# Patient Record
Sex: FEMALE | Race: WHITE | NOT HISPANIC OR LATINO
[De-identification: names, ages, dates, MRNs, and addresses within clinical notes are randomized per-mention and may not be internally consistent; named-entity substitution may affect disease eponyms.]

---

## 2017-03-13 ENCOUNTER — APPOINTMENT (OUTPATIENT)
Dept: INTERNAL MEDICINE | Facility: CLINIC | Age: 22
End: 2017-03-13

## 2017-03-13 VITALS
HEIGHT: 64 IN | RESPIRATION RATE: 12 BRPM | TEMPERATURE: 98 F | OXYGEN SATURATION: 99 % | WEIGHT: 210 LBS | SYSTOLIC BLOOD PRESSURE: 125 MMHG | HEART RATE: 98 BPM | DIASTOLIC BLOOD PRESSURE: 79 MMHG | BODY MASS INDEX: 35.85 KG/M2

## 2017-11-21 ENCOUNTER — EMERGENCY (EMERGENCY)
Facility: HOSPITAL | Age: 22
LOS: 1 days | Discharge: HOME | End: 2017-11-21
Attending: EMERGENCY MEDICINE | Admitting: EMERGENCY MEDICINE
Payer: COMMERCIAL

## 2017-11-21 VITALS
DIASTOLIC BLOOD PRESSURE: 74 MMHG | RESPIRATION RATE: 22 BRPM | OXYGEN SATURATION: 98 % | SYSTOLIC BLOOD PRESSURE: 124 MMHG | HEART RATE: 106 BPM | TEMPERATURE: 98 F

## 2017-11-21 PROCEDURE — 99283 EMERGENCY DEPT VISIT LOW MDM: CPT

## 2017-11-21 RX ORDER — IBUPROFEN 200 MG
600 TABLET ORAL ONCE
Qty: 0 | Refills: 0 | Status: COMPLETED | OUTPATIENT
Start: 2017-11-21 | End: 2017-11-21

## 2017-11-21 RX ADMIN — Medication 600 MILLIGRAM(S): at 13:06
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
## 2017-11-21 NOTE — ED PROVIDER NOTE - PHYSICAL EXAMINATION
Gen: NAD, AOx3  Head: NCAT  HEENT: PERRL, oral mucosa moist, normal conjunctiva  Lung: CTAB, no respiratory distress  CV: rrr, no murmurs, Normal perfusion  Abd: soft, NTND, no CVA tenderness  MSK: No edema, no visible deformities, all extremity joints nontender with FROM; entire spine without midline tenderness and with full ROM, no paravertebral tenderness, no stepoffs or masses; chest wall nontender, +left sided muscular neck tenderness; pelvis stable nontender  Neuro: No focal neurologic deficits, CN intact, motor and sensation intact, no cerebellar signs   Skin: No rash   Psych: normal affect Gen: NAD, AOx3  Head: NCAT  HEENT: PERRL, oral mucosa moist, normal conjunctiva  Lung: CTAB, no respiratory distress  CV: rrr, no murmurs, Normal perfusion  Abd: soft, NTND, no CVA tenderness  MSK: No edema, no visible deformities, all extremity joints nontender with FROM; entire spine without midline tenderness and with full ROM, no paravertebral tenderness, no stepoffs or masses; chest wall nontender, +left sided muscular neck tenderness with spasm; pelvis stable nontender. Small abrasion to left forearm  Neuro: No focal neurologic deficits, CN intact, motor and sensation intact, no cerebellar signs   Skin: No rash   Psych: normal affect

## 2017-11-21 NOTE — ED PROVIDER NOTE - ATTENDING CONTRIBUTION TO CARE
Toña Fam MD - Attending Physician: I have personally seen and examined this patient with the resident.  I have fully participated in the care of this patient. I have reviewed all pertinent clinical information, including history, physical exam, plan and the Resident’s note and agree except as noted. See MDM
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
## 2017-11-22 ENCOUNTER — TRANSCRIPTION ENCOUNTER (OUTPATIENT)
Age: 22
End: 2017-11-22
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
## 2017-12-01 ENCOUNTER — APPOINTMENT (OUTPATIENT)
Dept: INTERNAL MEDICINE | Facility: CLINIC | Age: 22
End: 2017-12-01
Payer: COMMERCIAL

## 2017-12-01 VITALS
DIASTOLIC BLOOD PRESSURE: 77 MMHG | HEIGHT: 64 IN | OXYGEN SATURATION: 98 % | WEIGHT: 125 LBS | SYSTOLIC BLOOD PRESSURE: 119 MMHG | RESPIRATION RATE: 12 BRPM | HEART RATE: 93 BPM | BODY MASS INDEX: 21.34 KG/M2 | TEMPERATURE: 97.8 F
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
## 2017-12-01 DIAGNOSIS — R07.89 OTHER CHEST PAIN: ICD-10-CM

## 2017-12-01 DIAGNOSIS — Z87.312 PERSONAL HISTORY OF (HEALED) STRESS FRACTURE: ICD-10-CM

## 2017-12-01 PROCEDURE — 99214 OFFICE O/P EST MOD 30 MIN: CPT

## 2018-01-23 DIAGNOSIS — M54.5 LOW BACK PAIN: ICD-10-CM

## 2018-03-14 ENCOUNTER — APPOINTMENT (OUTPATIENT)
Dept: ORTHOPEDIC SURGERY | Facility: CLINIC | Age: 23
End: 2018-03-14
Payer: COMMERCIAL

## 2018-03-14 VITALS — BODY MASS INDEX: 20.49 KG/M2 | HEIGHT: 64 IN | WEIGHT: 120 LBS

## 2018-03-14 DIAGNOSIS — M54.9 DORSALGIA, UNSPECIFIED: ICD-10-CM
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
## 2018-03-14 DIAGNOSIS — M42.00 JUVENILE OSTEOCHONDROSIS OF SPINE, SITE UNSPECIFIED: ICD-10-CM

## 2018-03-14 PROCEDURE — 72070 X-RAY EXAM THORAC SPINE 2VWS: CPT

## 2018-03-14 PROCEDURE — 99244 OFF/OP CNSLTJ NEW/EST MOD 40: CPT

## 2018-04-06 ENCOUNTER — APPOINTMENT (OUTPATIENT)
Dept: ORTHOPEDIC SURGERY | Facility: CLINIC | Age: 23
End: 2018-04-06
Payer: COMMERCIAL

## 2018-04-06 VITALS
BODY MASS INDEX: 20.49 KG/M2 | DIASTOLIC BLOOD PRESSURE: 74 MMHG | HEART RATE: 79 BPM | WEIGHT: 120 LBS | HEIGHT: 64 IN | SYSTOLIC BLOOD PRESSURE: 113 MMHG

## 2018-04-06 PROCEDURE — 72040 X-RAY EXAM NECK SPINE 2-3 VW: CPT

## 2018-04-06 PROCEDURE — 99214 OFFICE O/P EST MOD 30 MIN: CPT

## 2018-05-07 ENCOUNTER — APPOINTMENT (OUTPATIENT)
Dept: ORTHOPEDIC SURGERY | Facility: CLINIC | Age: 23
End: 2018-05-07
Payer: COMMERCIAL

## 2018-05-07 VITALS
SYSTOLIC BLOOD PRESSURE: 121 MMHG | HEIGHT: 64 IN | BODY MASS INDEX: 20.49 KG/M2 | DIASTOLIC BLOOD PRESSURE: 84 MMHG | WEIGHT: 120 LBS | HEART RATE: 100 BPM

## 2018-05-07 DIAGNOSIS — M25.512 PAIN IN LEFT SHOULDER: ICD-10-CM

## 2018-05-07 DIAGNOSIS — G89.29 PAIN IN LEFT SHOULDER: ICD-10-CM

## 2018-05-07 PROCEDURE — 99203 OFFICE O/P NEW LOW 30 MIN: CPT

## 2018-05-07 PROCEDURE — 73030 X-RAY EXAM OF SHOULDER: CPT | Mod: LT

## 2018-05-09 PROBLEM — M25.512 CHRONIC PERISCAPULAR PAIN ON LEFT SIDE: Status: ACTIVE | Noted: 2018-05-09

## 2018-05-10 ENCOUNTER — APPOINTMENT (OUTPATIENT)
Dept: ORTHOPEDIC SURGERY | Facility: CLINIC | Age: 23
End: 2018-05-10
Payer: COMMERCIAL

## 2018-05-10 DIAGNOSIS — M54.6 PAIN IN THORACIC SPINE: ICD-10-CM

## 2018-05-10 PROCEDURE — 99214 OFFICE O/P EST MOD 30 MIN: CPT

## 2018-05-21 ENCOUNTER — APPOINTMENT (OUTPATIENT)
Dept: INTERNAL MEDICINE | Facility: CLINIC | Age: 23
End: 2018-05-21
Payer: COMMERCIAL

## 2018-05-21 VITALS
RESPIRATION RATE: 12 BRPM | SYSTOLIC BLOOD PRESSURE: 112 MMHG | OXYGEN SATURATION: 95 % | DIASTOLIC BLOOD PRESSURE: 74 MMHG | WEIGHT: 125 LBS | BODY MASS INDEX: 21.34 KG/M2 | HEIGHT: 64 IN | TEMPERATURE: 98.4 F | HEART RATE: 90 BPM

## 2018-05-21 PROCEDURE — 99214 OFFICE O/P EST MOD 30 MIN: CPT

## 2018-06-10 NOTE — HISTORY OF PRESENT ILLNESS
[FreeTextEntry8] : 23 yr old female here for acute.  Starting having chills 3 nights ago, along w/cold sore.  Since then, also developed sore throat, upset stomach, nasal congestion, cough.  Denies any ear ache, chest pain, abd pain, diarrhea.  Started taking zyrtec but no relief in sx.  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

## 2018-06-10 NOTE — PHYSICAL EXAM
[No Acute Distress] : no acute distress [Well Nourished] : well nourished [Well Developed] : well developed [Well-Appearing] : well-appearing [Normal Voice/Communication] : normal voice/communication [Normal Sclera/Conjunctiva] : normal sclera/conjunctiva [EOMI] : extraocular movements intact [Normal Outer Ear/Nose] : the outer ears and nose were normal in appearance [Normal TMs] : both tympanic membranes were normal [No JVD] : no jugular venous distention [Supple] : supple [No Lymphadenopathy] : no lymphadenopathy [Thyroid Normal, No Nodules] : the thyroid was normal and there were no nodules present [No Respiratory Distress] : no respiratory distress  [Clear to Auscultation] : lungs were clear to auscultation bilaterally [No Accessory Muscle Use] : no accessory muscle use [Normal Rate] : normal rate  [Regular Rhythm] : with a regular rhythm [Normal S1, S2] : normal S1 and S2 [No Murmur] : no murmur heard [Soft] : abdomen soft [Non Tender] : non-tender [Non-distended] : non-distended [No Masses] : no abdominal mass palpated [No HSM] : no HSM [Normal Bowel Sounds] : normal bowel sounds [Normal Supraclavicular Nodes] : no supraclavicular lymphadenopathy [Normal Posterior Cervical Nodes] : no posterior cervical lymphadenopathy [Normal Anterior Cervical Nodes] : no anterior cervical lymphadenopathy [No Joint Swelling] : no joint swelling [Grossly Normal Strength/Tone] : grossly normal strength/tone [No Rash] : no rash [Normal Gait] : normal gait [Coordination Grossly Intact] : coordination grossly intact [No Focal Deficits] : no focal deficits [Speech Grossly Normal] : speech grossly normal [Memory Grossly Normal] : memory grossly normal [Normal Affect] : the affect was normal [Alert and Oriented x3] : oriented to person, place, and time [Normal Mood] : the mood was normal [Normal Insight/Judgement] : insight and judgment were intact [de-identified] : nasal mucosal congestion with clear discharge; trace PND on oropharynx

## 2018-06-10 NOTE — ASSESSMENT
[FreeTextEntry1] : 23 yr old female w/ acute viral syndrome / viral URI with cough, nasal congestion, and rhinorrhea.  Supportive care d/w pt:  rest, fluids, fever/pain mgmt, sx mgmt w/OTC remedies, rx Flonase usage and sfx.  RTO if sx persist or worsen.  Ordering Valtrex for cold sore as well.  Counseled patient for greater than 50% of this visit, including diagnoses information, medication usage and side effects, therapeutic goals and options, and followup. Patient's questions were answered. Patient expressed understanding of, and agreement with, assessment and plan.

## 2018-06-10 NOTE — REVIEW OF SYSTEMS
[Chills] : chills [Night Sweats] : night sweats [Nasal Discharge] : nasal discharge [Sore Throat] : sore throat [Postnasal Drip] : postnasal drip [Cough] : cough [Negative] : Neurological [Fever] : no fever [Fatigue] : no fatigue [Hot Flashes] : no hot flashes [Earache] : no earache [Hearing Loss] : no hearing loss [Nosebleed] : no nosebleeds [Hoarseness] : no hoarseness [Wheezing] : no wheezing [Dyspnea on Exertion] : no dyspnea on exertion [FreeTextEntry4] : cold sore [FreeTextEntry7] : see HPI [FreeTextEntry9] : diffuse myalgias

## 2018-06-15 ENCOUNTER — APPOINTMENT (OUTPATIENT)
Dept: INTERNAL MEDICINE | Facility: CLINIC | Age: 23
End: 2018-06-15
Payer: COMMERCIAL

## 2018-06-15 VITALS
WEIGHT: 124 LBS | HEIGHT: 64 IN | SYSTOLIC BLOOD PRESSURE: 112 MMHG | RESPIRATION RATE: 12 BRPM | BODY MASS INDEX: 21.17 KG/M2 | DIASTOLIC BLOOD PRESSURE: 71 MMHG | TEMPERATURE: 98 F | HEART RATE: 90 BPM | OXYGEN SATURATION: 98 %

## 2018-06-15 DIAGNOSIS — J30.9 ALLERGIC RHINITIS, UNSPECIFIED: ICD-10-CM

## 2018-06-15 DIAGNOSIS — Z86.19 PERSONAL HISTORY OF OTHER INFECTIOUS AND PARASITIC DISEASES: ICD-10-CM

## 2018-06-15 DIAGNOSIS — Z87.09 PERSONAL HISTORY OF OTHER DISEASES OF THE RESPIRATORY SYSTEM: ICD-10-CM

## 2018-06-15 DIAGNOSIS — Z02.0 ENCOUNTER FOR EXAMINATION FOR ADMISSION TO EDUCATIONAL INSTITUTION: ICD-10-CM

## 2018-06-15 DIAGNOSIS — Z11.1 ENCOUNTER FOR SCREENING FOR RESPIRATORY TUBERCULOSIS: ICD-10-CM

## 2018-06-15 DIAGNOSIS — B97.89 ACUTE UPPER RESPIRATORY INFECTION, UNSPECIFIED: ICD-10-CM

## 2018-06-15 DIAGNOSIS — F41.1 GENERALIZED ANXIETY DISORDER: ICD-10-CM

## 2018-06-15 DIAGNOSIS — B34.9 VIRAL INFECTION, UNSPECIFIED: ICD-10-CM

## 2018-06-15 DIAGNOSIS — Z23 ENCOUNTER FOR IMMUNIZATION: ICD-10-CM

## 2018-06-15 DIAGNOSIS — Z01.84 ENCOUNTER FOR ANTIBODY RESPONSE EXAMINATION: ICD-10-CM

## 2018-06-15 DIAGNOSIS — J06.9 ACUTE UPPER RESPIRATORY INFECTION, UNSPECIFIED: ICD-10-CM

## 2018-06-15 DIAGNOSIS — Z86.59 PERSONAL HISTORY OF OTHER MENTAL AND BEHAVIORAL DISORDERS: ICD-10-CM

## 2018-06-15 PROCEDURE — 99395 PREV VISIT EST AGE 18-39: CPT | Mod: 25

## 2018-06-15 PROCEDURE — 90471 IMMUNIZATION ADMIN: CPT

## 2018-06-15 PROCEDURE — 90715 TDAP VACCINE 7 YRS/> IM: CPT

## 2018-06-15 RX ORDER — MELOXICAM 15 MG/1
15 TABLET ORAL
Qty: 30 | Refills: 1 | Status: COMPLETED | COMMUNITY
Start: 2018-05-10 | End: 2018-06-15

## 2018-06-15 RX ORDER — IBUPROFEN 800 MG/1
800 TABLET, FILM COATED ORAL
Qty: 90 | Refills: 0 | Status: COMPLETED | COMMUNITY
Start: 2018-03-14 | End: 2018-06-15

## 2018-06-15 RX ORDER — IBUPROFEN 600 MG/1
600 TABLET, FILM COATED ORAL 3 TIMES DAILY
Qty: 15 | Refills: 1 | Status: COMPLETED | COMMUNITY
Start: 2017-12-01 | End: 2018-06-15

## 2018-06-15 RX ORDER — CYCLOBENZAPRINE HYDROCHLORIDE 10 MG/1
10 TABLET, FILM COATED ORAL
Qty: 15 | Refills: 0 | Status: COMPLETED | COMMUNITY
Start: 2017-11-22 | End: 2018-06-15

## 2018-06-22 LAB
25(OH)D3 SERPL-MCNC: 34.3 NG/ML
ADJUSTED MITOGEN: 8.03 IU/ML
ADJUSTED TB AG: 0 IU/ML
ALBUMIN SERPL ELPH-MCNC: 4.6 G/DL
ALP BLD-CCNC: 58 U/L
ALT SERPL-CCNC: 21 U/L
ANION GAP SERPL CALC-SCNC: 15 MMOL/L
APPEARANCE: CLEAR
AST SERPL-CCNC: 40 U/L
BILIRUB SERPL-MCNC: 0.3 MG/DL
BILIRUBIN URINE: NEGATIVE
BLOOD URINE: NEGATIVE
BUN SERPL-MCNC: 12 MG/DL
CALCIUM SERPL-MCNC: 9.9 MG/DL
CHLORIDE SERPL-SCNC: 99 MMOL/L
CHOLEST SERPL-MCNC: 160 MG/DL
CHOLEST/HDLC SERPL: 2.2 RATIO
CO2 SERPL-SCNC: 25 MMOL/L
COLOR: YELLOW
CREAT SERPL-MCNC: 0.73 MG/DL
ESTIMATED AVERAGE GLUCOSE: 100 MG/DL
FOLATE SERPL-MCNC: >20 NG/ML
GLUCOSE QUALITATIVE U: NEGATIVE MG/DL
GLUCOSE SERPL-MCNC: 60 MG/DL
HBA1C MFR BLD HPLC: 5.1 %
HBV SURFACE AB SER QL: NONREACTIVE
HBV SURFACE AG SER QL: NONREACTIVE
HDLC SERPL-MCNC: 73 MG/DL
IRON SERPL-MCNC: 152 UG/DL
KETONES URINE: NEGATIVE
LDLC SERPL CALC-MCNC: 65 MG/DL
LEUKOCYTE ESTERASE URINE: NEGATIVE
M TB IFN-G BLD-IMP: NEGATIVE
MEV IGG FLD QL IA: 190 AU/ML
MEV IGG+IGM SER-IMP: POSITIVE
MUV AB SER-ACNC: POSITIVE
MUV IGG SER QL IA: >300 AU/ML
NITRITE URINE: NEGATIVE
PH URINE: 7
POTASSIUM SERPL-SCNC: 6.1 MMOL/L
PROT SERPL-MCNC: 8 G/DL
PROTEIN URINE: NEGATIVE MG/DL
QUANTIFERON GOLD NIL: 0.02 IU/ML
RUBV IGG FLD-ACNC: 11.5 INDEX
RUBV IGG SER-IMP: POSITIVE
SODIUM SERPL-SCNC: 139 MMOL/L
SPECIFIC GRAVITY URINE: 1.02
TRIGL SERPL-MCNC: 108 MG/DL
TSH SERPL-ACNC: 2.84 UIU/ML
UROBILINOGEN URINE: NEGATIVE MG/DL
VIT B12 SERPL-MCNC: 878 PG/ML
VZV AB TITR SER: POSITIVE
VZV IGG SER IF-ACNC: 2745 INDEX

## 2018-06-25 LAB
BASOPHILS # BLD AUTO: 0.03 K/UL
BASOPHILS NFR BLD AUTO: 0.5 %
EOSINOPHIL # BLD AUTO: 0.14 K/UL
EOSINOPHIL NFR BLD AUTO: 2.1 %
HCT VFR BLD CALC: 41 %
HGB BLD-MCNC: 14 G/DL
IMM GRANULOCYTES NFR BLD AUTO: 0.2 %
LYMPHOCYTES # BLD AUTO: 1.55 K/UL
LYMPHOCYTES NFR BLD AUTO: 23.4 %
MAN DIFF?: NORMAL
MCHC RBC-ENTMCNC: 29.9 PG
MCHC RBC-ENTMCNC: 34.1 GM/DL
MCV RBC AUTO: 87.4 FL
MONOCYTES # BLD AUTO: 0.57 K/UL
MONOCYTES NFR BLD AUTO: 8.6 %
NEUTROPHILS # BLD AUTO: 4.31 K/UL
NEUTROPHILS NFR BLD AUTO: 65.2 %
PLATELET # BLD AUTO: 261 K/UL
RBC # BLD: 4.69 M/UL
RBC # FLD: 13.3 %
WBC # FLD AUTO: 6.61 K/UL

## 2018-06-27 ENCOUNTER — APPOINTMENT (OUTPATIENT)
Dept: INTERNAL MEDICINE | Facility: CLINIC | Age: 23
End: 2018-06-27

## 2018-07-04 NOTE — ASSESSMENT
[FreeTextEntry1] : 23 yr old female w/hx of allergic rhinitis, anxiety and depression, here for CPE and work PE.  Normal CPE/ work PE.  Screening for TB and immunity status.  To get TdaP today.  Anticipatory guidance provided.  Allergies, depression and anxiety are all clinically stable.  Labs today.  Counseled patient for greater than 50% of this visit, including diagnoses information, medication usage and side effects, therapeutic goals and options, and followup. Patient's questions were answered. Patient expressed understanding of, and agreement with, assessment and plan.

## 2018-07-04 NOTE — HISTORY OF PRESENT ILLNESS
[de-identified] : 23 yr old female here for CPE and school PE.  Currently pt denies any N/V/D, weakness, malaise, F/C/NS, HA, LH, LOC, COV, palpitations, CP, SOB, ALEGRE, hemoptysis, abdominal pain, edema, claudication, limb weakness or paresthesias, polydipsia, polyphagia, polyuria, weight loss, hyperhidrosis, tremors, agitation, urinary F/E/D, melena, or BRBPR.\par \par Last GYN exam 2 yr ago = normal\par

## 2018-07-04 NOTE — PHYSICAL EXAM
[No Acute Distress] : no acute distress [Well Nourished] : well nourished [Well Developed] : well developed [Well-Appearing] : well-appearing [Normal Sclera/Conjunctiva] : normal sclera/conjunctiva [PERRL] : pupils equal round and reactive to light [EOMI] : extraocular movements intact [Normal Outer Ear/Nose] : the outer ears and nose were normal in appearance [Normal Oropharynx] : the oropharynx was normal [No JVD] : no jugular venous distention [Supple] : supple [No Lymphadenopathy] : no lymphadenopathy [Thyroid Normal, No Nodules] : the thyroid was normal and there were no nodules present [No Respiratory Distress] : no respiratory distress  [Clear to Auscultation] : lungs were clear to auscultation bilaterally [No Accessory Muscle Use] : no accessory muscle use [Normal Rate] : normal rate  [Regular Rhythm] : with a regular rhythm [Normal S1, S2] : normal S1 and S2 [No Murmur] : no murmur heard [No Carotid Bruits] : no carotid bruits [No Edema] : there was no peripheral edema [Soft] : abdomen soft [Non Tender] : non-tender [Non-distended] : non-distended [No Masses] : no abdominal mass palpated [No HSM] : no HSM [Normal Bowel Sounds] : normal bowel sounds [Normal Posterior Cervical Nodes] : no posterior cervical lymphadenopathy [Normal Anterior Cervical Nodes] : no anterior cervical lymphadenopathy [No CVA Tenderness] : no CVA  tenderness [No Spinal Tenderness] : no spinal tenderness [No Joint Swelling] : no joint swelling [Grossly Normal Strength/Tone] : grossly normal strength/tone [No Rash] : no rash [Normal Gait] : normal gait [Coordination Grossly Intact] : coordination grossly intact [No Focal Deficits] : no focal deficits [Deep Tendon Reflexes (DTR)] : deep tendon reflexes were 2+ and symmetric [Normal Affect] : the affect was normal [Normal Insight/Judgement] : insight and judgment were intact [Normal Voice/Communication] : normal voice/communication [Normal TMs] : both tympanic membranes were normal [Normal Nasal Mucosa] : the nasal mucosa was normal [Normal Supraclavicular Nodes] : no supraclavicular lymphadenopathy [No Skin Lesions] : no skin lesions [Speech Grossly Normal] : speech grossly normal [Memory Grossly Normal] : memory grossly normal [Alert and Oriented x3] : oriented to person, place, and time [Normal Mood] : the mood was normal

## 2019-01-04 ENCOUNTER — OUTPATIENT (OUTPATIENT)
Dept: OUTPATIENT SERVICES | Facility: HOSPITAL | Age: 24
LOS: 1 days | End: 2019-01-04
Payer: COMMERCIAL

## 2019-01-04 VITALS
OXYGEN SATURATION: 100 % | HEART RATE: 89 BPM | DIASTOLIC BLOOD PRESSURE: 79 MMHG | RESPIRATION RATE: 16 BRPM | HEIGHT: 64 IN | WEIGHT: 121.92 LBS | SYSTOLIC BLOOD PRESSURE: 112 MMHG | TEMPERATURE: 98 F

## 2019-01-04 DIAGNOSIS — Z01.818 ENCOUNTER FOR OTHER PREPROCEDURAL EXAMINATION: ICD-10-CM

## 2019-01-04 DIAGNOSIS — N89.6 TIGHT HYMENAL RING: ICD-10-CM

## 2019-01-04 DIAGNOSIS — F41.9 ANXIETY DISORDER, UNSPECIFIED: ICD-10-CM

## 2019-01-04 LAB
HCG SERPL-ACNC: <2 MIU/ML — SIGNIFICANT CHANGE UP
HCT VFR BLD CALC: 37.9 % — SIGNIFICANT CHANGE UP (ref 34.5–45)
HGB BLD-MCNC: 12.5 G/DL — SIGNIFICANT CHANGE UP (ref 11.5–15.5)
MCHC RBC-ENTMCNC: 29.1 PG — SIGNIFICANT CHANGE UP (ref 27–34)
MCHC RBC-ENTMCNC: 33 GM/DL — SIGNIFICANT CHANGE UP (ref 32–36)
MCV RBC AUTO: 88.3 FL — SIGNIFICANT CHANGE UP (ref 80–100)
PLATELET # BLD AUTO: 268 K/UL — SIGNIFICANT CHANGE UP (ref 150–400)
RBC # BLD: 4.29 M/UL — SIGNIFICANT CHANGE UP (ref 3.8–5.2)
RBC # FLD: 13 % — SIGNIFICANT CHANGE UP (ref 10.3–14.5)
WBC # BLD: 3.95 K/UL — SIGNIFICANT CHANGE UP (ref 3.8–10.5)
WBC # FLD AUTO: 3.95 K/UL — SIGNIFICANT CHANGE UP (ref 3.8–10.5)

## 2019-01-04 PROCEDURE — 85027 COMPLETE CBC AUTOMATED: CPT

## 2019-01-04 PROCEDURE — G0463: CPT

## 2019-01-04 PROCEDURE — 84702 CHORIONIC GONADOTROPIN TEST: CPT

## 2019-01-04 RX ORDER — SODIUM CHLORIDE 9 MG/ML
3 INJECTION INTRAMUSCULAR; INTRAVENOUS; SUBCUTANEOUS EVERY 8 HOURS
Qty: 0 | Refills: 0 | Status: DISCONTINUED | OUTPATIENT
Start: 2019-01-18 | End: 2019-02-02

## 2019-01-04 RX ORDER — LIDOCAINE HCL 20 MG/ML
0.2 VIAL (ML) INJECTION ONCE
Qty: 0 | Refills: 0 | Status: DISCONTINUED | OUTPATIENT
Start: 2019-01-18 | End: 2019-02-02

## 2019-01-04 NOTE — H&P PST ADULT - PROBLEM SELECTOR PLAN 1
Partial hymenectomy  PSt instruction given CBC, serum pregnancy test drawn pending result   Urine pregnancy test on DOS

## 2019-01-04 NOTE — H&P PST ADULT - ATTENDING COMMENTS
pt with tight hymen, unable to admit more than 1 finger, after many discussions, pt requested release of tissue, consented to partial hymenectomy. Pt understands risks, benefits, possible need for reop later

## 2019-01-04 NOTE — H&P PST ADULT - NSANTHOSAYNRD_GEN_A_CORE
No. STONE screening performed.  STOP BANG Legend: 0-2 = LOW Risk; 3-4 = INTERMEDIATE Risk; 5-8 = HIGH Risk

## 2019-01-04 NOTE — H&P PST ADULT - HISTORY OF PRESENT ILLNESS
22 y/o female with hx of anxiety /panic disorder came in for PSt today for partial hymenectomy. Patient has hx of pelvic discomfort went to see Dr Saleh evaluated and indicated this procedure for treatment.

## 2019-01-17 ENCOUNTER — TRANSCRIPTION ENCOUNTER (OUTPATIENT)
Age: 24
End: 2019-01-17

## 2019-01-18 ENCOUNTER — OUTPATIENT (OUTPATIENT)
Dept: OUTPATIENT SERVICES | Facility: HOSPITAL | Age: 24
LOS: 1 days | End: 2019-01-18
Payer: COMMERCIAL

## 2019-01-18 VITALS
WEIGHT: 121.92 LBS | TEMPERATURE: 98 F | RESPIRATION RATE: 16 BRPM | DIASTOLIC BLOOD PRESSURE: 79 MMHG | OXYGEN SATURATION: 100 % | SYSTOLIC BLOOD PRESSURE: 120 MMHG | HEIGHT: 64 IN | HEART RATE: 112 BPM

## 2019-01-18 VITALS
OXYGEN SATURATION: 100 % | SYSTOLIC BLOOD PRESSURE: 106 MMHG | DIASTOLIC BLOOD PRESSURE: 57 MMHG | RESPIRATION RATE: 16 BRPM | TEMPERATURE: 97 F | HEART RATE: 100 BPM

## 2019-01-18 DIAGNOSIS — Z01.818 ENCOUNTER FOR OTHER PREPROCEDURAL EXAMINATION: ICD-10-CM

## 2019-01-18 DIAGNOSIS — N89.6 TIGHT HYMENAL RING: ICD-10-CM

## 2019-01-18 PROCEDURE — 56700 PRTL HYMNCTMY/REVJ HYMNL RNG: CPT

## 2019-01-18 RX ORDER — ACETAMINOPHEN 500 MG
1000 TABLET ORAL ONCE
Qty: 0 | Refills: 0 | Status: DISCONTINUED | OUTPATIENT
Start: 2019-01-18 | End: 2019-02-02

## 2019-01-18 RX ORDER — ACETAMINOPHEN 500 MG
1000 TABLET ORAL ONCE
Qty: 0 | Refills: 0 | Status: COMPLETED | OUTPATIENT
Start: 2019-01-18 | End: 2019-01-18

## 2019-01-18 RX ORDER — CLONAZEPAM 1 MG
0 TABLET ORAL
Qty: 0 | Refills: 0 | COMMUNITY

## 2019-01-18 RX ORDER — CELECOXIB 200 MG/1
200 CAPSULE ORAL ONCE
Qty: 0 | Refills: 0 | Status: COMPLETED | OUTPATIENT
Start: 2019-01-18 | End: 2019-01-18

## 2019-01-18 RX ORDER — ONDANSETRON 8 MG/1
4 TABLET, FILM COATED ORAL ONCE
Qty: 0 | Refills: 0 | Status: DISCONTINUED | OUTPATIENT
Start: 2019-01-18 | End: 2019-02-02

## 2019-01-18 RX ORDER — HYDROMORPHONE HYDROCHLORIDE 2 MG/ML
0.25 INJECTION INTRAMUSCULAR; INTRAVENOUS; SUBCUTANEOUS
Qty: 0 | Refills: 0 | Status: DISCONTINUED | OUTPATIENT
Start: 2019-01-18 | End: 2019-01-18

## 2019-01-18 RX ORDER — OXYCODONE HYDROCHLORIDE 5 MG/1
5 TABLET ORAL ONCE
Qty: 0 | Refills: 0 | Status: DISCONTINUED | OUTPATIENT
Start: 2019-01-18 | End: 2019-01-18

## 2019-01-18 RX ORDER — VENLAFAXINE HCL 75 MG
0 CAPSULE, EXT RELEASE 24 HR ORAL
Qty: 0 | Refills: 0 | COMMUNITY

## 2019-01-18 RX ORDER — SODIUM CHLORIDE 9 MG/ML
1000 INJECTION, SOLUTION INTRAVENOUS
Qty: 0 | Refills: 0 | Status: DISCONTINUED | OUTPATIENT
Start: 2019-01-18 | End: 2019-02-02

## 2019-01-18 NOTE — BRIEF OPERATIVE NOTE - PROCEDURE
<<-----Click on this checkbox to enter Procedure Hymenectomy, partial, or hymenal ring revision  01/18/2019  release of thick hymen band, 2 lateral incisions made  Active  CMELGAR

## 2019-01-18 NOTE — ASU DISCHARGE PLAN (ADULT/PEDIATRIC). - ACTIVITY LEVEL
nothing per rectum/4 days/no exercise/nothing per vagina/no tampons/no sports/gym/no heavy lifting/quiet play/no tub baths no exercise/nothing per vagina/no tub baths/nothing per rectum/no tampons/4 days/no sports/gym/no heavy lifting/quiet play/no intercourse

## 2019-04-11 ENCOUNTER — APPOINTMENT (OUTPATIENT)
Dept: INTERNAL MEDICINE | Facility: CLINIC | Age: 24
End: 2019-04-11
Payer: COMMERCIAL

## 2019-04-11 VITALS
HEIGHT: 64 IN | RESPIRATION RATE: 12 BRPM | HEART RATE: 92 BPM | TEMPERATURE: 98.1 F | OXYGEN SATURATION: 100 % | WEIGHT: 122 LBS | DIASTOLIC BLOOD PRESSURE: 73 MMHG | SYSTOLIC BLOOD PRESSURE: 126 MMHG | BODY MASS INDEX: 20.83 KG/M2

## 2019-04-11 DIAGNOSIS — H57.11 OCULAR PAIN, RIGHT EYE: ICD-10-CM

## 2019-04-11 DIAGNOSIS — Z87.09 PERSONAL HISTORY OF OTHER DISEASES OF THE RESPIRATORY SYSTEM: ICD-10-CM

## 2019-04-11 PROCEDURE — 99214 OFFICE O/P EST MOD 30 MIN: CPT

## 2019-04-11 NOTE — PHYSICAL EXAM
[Normal Sclera/Conjunctiva] : normal sclera/conjunctiva [PERRL] : pupils equal round and reactive to light [No Acute Distress] : no acute distress [EOMI] : extraocular movements intact [Fundoscopic Exam Performed] : fundoscopic ~T exam ~C was performed [Normal Outer Ear/Nose] : the outer ears and nose were normal in appearance [Normal Oropharynx] : the oropharynx was normal [Normal TMs] : both tympanic membranes were normal [No JVD] : no jugular venous distention [Supple] : supple [No Lymphadenopathy] : no lymphadenopathy [No Respiratory Distress] : no respiratory distress  [Thyroid Normal, No Nodules] : the thyroid was normal and there were no nodules present [Clear to Auscultation] : lungs were clear to auscultation bilaterally [No Accessory Muscle Use] : no accessory muscle use [Normal Rate] : normal rate  [Regular Rhythm] : with a regular rhythm [de-identified] : CRUSTY NAAL D/C JIHAN,MILD JIHAN MAXILLARY  TENDERNESS

## 2019-04-11 NOTE — ASSESSMENT
[FreeTextEntry1] : 24 Y OLD FEM PRESENTS WITH ALLERGIC RHINITIS WITH SUPERIMPOSED MAXILLARY SINUSITIS= DOXY 100 MG BID RX SENT,ADD MUCINEX\par R EYE PAIN MAYBE RELATED TO SINUSITIS BUT REFER TO SEE OPHTHALMOLOGY FOR EVAL,LAST SEEN 1 Y AGO = WNL

## 2019-04-11 NOTE — HISTORY OF PRESENT ILLNESS
[FreeTextEntry8] : PT SEEN BY ENT 3 DAYS AGO WITH NASAL CONGESTION ITCHY EYES AND NOSE AND RX FLONASE AND ASTELIN ,USING ONLY LAST ONE WITH SOME IMPROVEMENT OF SX BUT LAST 2 DAYS HAS BEEN HAVING PERSISTENT PURULENT NASAL D/C MILD CHILLS AND PRESSURE BEHIND R EYE ,NO VISION DISTURBANCE.\par PT ASLO TAKES ZYRTEC OTC WITH LITTLE HELP

## 2019-04-11 NOTE — REVIEW OF SYSTEMS
[Chills] : chills [Pain] : pain [Nasal Discharge] : nasal discharge [Postnasal Drip] : postnasal drip [Cough] : cough [Negative] : Heme/Lymph

## 2019-07-15 ENCOUNTER — TRANSCRIPTION ENCOUNTER (OUTPATIENT)
Age: 24
End: 2019-07-15

## 2019-07-17 ENCOUNTER — APPOINTMENT (OUTPATIENT)
Dept: PULMONOLOGY | Facility: CLINIC | Age: 24
End: 2019-07-17
Payer: COMMERCIAL

## 2019-07-17 VITALS
OXYGEN SATURATION: 98 % | DIASTOLIC BLOOD PRESSURE: 71 MMHG | HEIGHT: 64 IN | HEART RATE: 76 BPM | SYSTOLIC BLOOD PRESSURE: 106 MMHG | TEMPERATURE: 98.2 F | RESPIRATION RATE: 12 BRPM

## 2019-07-17 VITALS — WEIGHT: 120 LBS | BODY MASS INDEX: 20.6 KG/M2

## 2019-07-17 DIAGNOSIS — Z83.3 FAMILY HISTORY OF DIABETES MELLITUS: ICD-10-CM

## 2019-07-17 DIAGNOSIS — Z86.19 PERSONAL HISTORY OF OTHER INFECTIOUS AND PARASITIC DISEASES: ICD-10-CM

## 2019-07-17 DIAGNOSIS — Z87.09 PERSONAL HISTORY OF OTHER DISEASES OF THE RESPIRATORY SYSTEM: ICD-10-CM

## 2019-07-17 PROCEDURE — 99203 OFFICE O/P NEW LOW 30 MIN: CPT

## 2019-07-17 NOTE — ASSESSMENT
[FreeTextEntry1] : In summary the patient is a 24-year-old female with past medical history significant for who presents with signs and symptoms of acute pharyngitis. Patient is started on antibiotics and is instructed to followup on a when necessary basis

## 2019-07-17 NOTE — HISTORY OF PRESENT ILLNESS
[FreeTextEntry1] : Patient is a 24-year-old female with past medical history significant for anxiety who presents for an acute visit. The patient complained of increasing cough occasional shortness of breath and associated sore throat for the last one week. She denies any fevers chills chest pain weight loss or hemoptysis

## 2019-07-17 NOTE — PHYSICAL EXAM
[General Appearance - Well Developed] : well developed [Normal Appearance] : normal appearance [Well Groomed] : well groomed [General Appearance - Well Nourished] : well nourished [No Deformities] : no deformities [General Appearance - In No Acute Distress] : no acute distress [Normal Conjunctiva] : the conjunctiva exhibited no abnormalities [Eyelids - No Xanthelasma] : the eyelids demonstrated no xanthelasmas [Normal Oropharynx] : normal oropharynx [II] : II [Neck Appearance] : the appearance of the neck was normal [Neck Cervical Mass (___cm)] : no neck mass was observed [Jugular Venous Distention Increased] : there was no jugular-venous distention [Heart Sounds] : normal S1 and S2 [Respiration, Rhythm And Depth] : normal respiratory rhythm and effort [Exaggerated Use Of Accessory Muscles For Inspiration] : no accessory muscle use [Auscultation Breath Sounds / Voice Sounds] : lungs were clear to auscultation bilaterally [Bowel Sounds] : normal bowel sounds [Abdomen Soft] : soft [Abnormal Walk] : normal gait [Nail Clubbing] : no clubbing of the fingernails [Cyanosis, Localized] : no localized cyanosis [] : no rash [No Focal Deficits] : no focal deficits [Oriented To Time, Place, And Person] : oriented to person, place, and time [Impaired Insight] : insight and judgment were intact [Affect] : the affect was normal [Mood] : the mood was normal

## 2019-12-17 ENCOUNTER — APPOINTMENT (OUTPATIENT)
Dept: INTERNAL MEDICINE | Facility: CLINIC | Age: 24
End: 2019-12-17
Payer: COMMERCIAL

## 2019-12-17 VITALS
DIASTOLIC BLOOD PRESSURE: 63 MMHG | WEIGHT: 111 LBS | RESPIRATION RATE: 12 BRPM | BODY MASS INDEX: 18.95 KG/M2 | HEART RATE: 103 BPM | SYSTOLIC BLOOD PRESSURE: 103 MMHG | HEIGHT: 64 IN | OXYGEN SATURATION: 100 % | TEMPERATURE: 97.7 F

## 2019-12-17 DIAGNOSIS — R63.4 ABNORMAL WEIGHT LOSS: ICD-10-CM

## 2019-12-17 DIAGNOSIS — F32.9 MAJOR DEPRESSIVE DISORDER, SINGLE EPISODE, UNSPECIFIED: ICD-10-CM

## 2019-12-17 DIAGNOSIS — K62.5 HEMORRHAGE OF ANUS AND RECTUM: ICD-10-CM

## 2019-12-17 LAB
ALBUMIN SERPL ELPH-MCNC: 5 G/DL
ALP BLD-CCNC: 57 U/L
ALT SERPL-CCNC: 16 U/L
ANION GAP SERPL CALC-SCNC: 12 MMOL/L
APPEARANCE: CLEAR
AST SERPL-CCNC: 20 U/L
BILIRUB SERPL-MCNC: 0.4 MG/DL
BILIRUBIN URINE: NEGATIVE
BLOOD URINE: NEGATIVE
BUN SERPL-MCNC: 11 MG/DL
CALCIUM SERPL-MCNC: 10.2 MG/DL
CHLORIDE SERPL-SCNC: 98 MMOL/L
CHOLEST SERPL-MCNC: 153 MG/DL
CHOLEST/HDLC SERPL: 1.8 RATIO
CO2 SERPL-SCNC: 27 MMOL/L
COLOR: NORMAL
CREAT SERPL-MCNC: 0.63 MG/DL
GLUCOSE QUALITATIVE U: NEGATIVE
GLUCOSE SERPL-MCNC: 73 MG/DL
HDLC SERPL-MCNC: 83 MG/DL
KETONES URINE: NEGATIVE
LDLC SERPL CALC-MCNC: 58 MG/DL
LEUKOCYTE ESTERASE URINE: NEGATIVE
NITRITE URINE: NEGATIVE
PH URINE: 7
POTASSIUM SERPL-SCNC: 4.9 MMOL/L
PROT SERPL-MCNC: 7.4 G/DL
PROTEIN URINE: NEGATIVE
SODIUM SERPL-SCNC: 137 MMOL/L
SPECIFIC GRAVITY URINE: 1.02
TRIGL SERPL-MCNC: 59 MG/DL
UROBILINOGEN URINE: NORMAL

## 2019-12-17 PROCEDURE — 99213 OFFICE O/P EST LOW 20 MIN: CPT

## 2019-12-17 PROCEDURE — 99395 PREV VISIT EST AGE 18-39: CPT

## 2019-12-17 RX ORDER — FLUTICASONE PROPIONATE 50 UG/1
50 SPRAY, METERED NASAL TWICE DAILY
Qty: 1 | Refills: 3 | Status: DISCONTINUED | COMMUNITY
Start: 2018-05-21 | End: 2019-12-17

## 2019-12-17 RX ORDER — TIZANIDINE 2 MG/1
2 TABLET ORAL
Qty: 30 | Refills: 2 | Status: DISCONTINUED | COMMUNITY
Start: 2017-12-01 | End: 2019-12-17

## 2019-12-17 RX ORDER — VALACYCLOVIR 500 MG/1
500 TABLET, FILM COATED ORAL
Qty: 30 | Refills: 1 | Status: DISCONTINUED | COMMUNITY
Start: 2018-05-21 | End: 2019-12-17

## 2019-12-17 RX ORDER — LEVOFLOXACIN 500 MG/1
500 TABLET, FILM COATED ORAL DAILY
Qty: 7 | Refills: 3 | Status: DISCONTINUED | COMMUNITY
Start: 2019-07-17 | End: 2019-12-17

## 2019-12-17 RX ORDER — DOXYCYCLINE 100 MG/1
100 TABLET, FILM COATED ORAL
Qty: 20 | Refills: 0 | Status: DISCONTINUED | COMMUNITY
Start: 2019-04-11 | End: 2019-12-17

## 2019-12-17 RX ORDER — GUAIFENESIN 600 MG/1
600 TABLET, EXTENDED RELEASE ORAL TWICE DAILY
Qty: 20 | Refills: 0 | Status: DISCONTINUED | COMMUNITY
Start: 2019-04-11 | End: 2019-12-17

## 2019-12-17 NOTE — REVIEW OF SYSTEMS
[Recent Change In Weight] : ~T recent weight change [Anxiety] : anxiety [Negative] : Heme/Lymph [FreeTextEntry7] : SEE HPI

## 2019-12-17 NOTE — ASSESSMENT
[FreeTextEntry1] : CPE OF 24 Y OLD FEM WITH PMX OF DEPRESSION- ANXIETY ON VENLAFAXINE BY PSYCH= CONTINUE THE SAME\par WT LOSS= LABS\par RECTAL BLEEDING FOR 6 M = TO SEE GI \par RTO IN 1 Y OR PRN

## 2019-12-18 ENCOUNTER — APPOINTMENT (OUTPATIENT)
Dept: INTERNAL MEDICINE | Facility: CLINIC | Age: 24
End: 2019-12-18

## 2019-12-23 LAB
BASOPHILS # BLD AUTO: 0.05 K/UL
BASOPHILS NFR BLD AUTO: 0.8 %
EOSINOPHIL # BLD AUTO: 0.08 K/UL
EOSINOPHIL NFR BLD AUTO: 1.3 %
HCT VFR BLD CALC: 38.7 %
HGB BLD-MCNC: 12.8 G/DL
IMM GRANULOCYTES NFR BLD AUTO: 0.2 %
LYMPHOCYTES # BLD AUTO: 1.6 K/UL
LYMPHOCYTES NFR BLD AUTO: 26.1 %
MAN DIFF?: NORMAL
MCHC RBC-ENTMCNC: 29.6 PG
MCHC RBC-ENTMCNC: 33.1 GM/DL
MCV RBC AUTO: 89.6 FL
MONOCYTES # BLD AUTO: 0.51 K/UL
MONOCYTES NFR BLD AUTO: 8.3 %
NEUTROPHILS # BLD AUTO: 3.89 K/UL
NEUTROPHILS NFR BLD AUTO: 63.3 %
PLATELET # BLD AUTO: 256 K/UL
RBC # BLD: 4.32 M/UL
RBC # FLD: 12.4 %
TSH SERPL-ACNC: 2.44 UIU/ML
WBC # FLD AUTO: 6.14 K/UL

## 2020-01-11 LAB
HBV SURFACE AB SER QL: NONREACTIVE
MEV IGG FLD QL IA: 173 AU/ML
MEV IGG+IGM SER-IMP: POSITIVE
MUV AB SER-ACNC: POSITIVE
MUV IGG SER QL IA: >300 AU/ML
RUBV IGG FLD-ACNC: 12.8 INDEX
RUBV IGG SER-IMP: POSITIVE
VZV AB TITR SER: POSITIVE
VZV IGG SER IF-ACNC: 2262 INDEX

## 2020-01-15 LAB
M TB IFN-G BLD-IMP: NEGATIVE
QUANTIFERON TB PLUS MITOGEN MINUS NIL: 8.08 IU/ML
QUANTIFERON TB PLUS NIL: 0.01 IU/ML
QUANTIFERON TB PLUS TB1 MINUS NIL: 0 IU/ML
QUANTIFERON TB PLUS TB2 MINUS NIL: 0 IU/ML

## 2020-01-16 ENCOUNTER — APPOINTMENT (OUTPATIENT)
Dept: INTERNAL MEDICINE | Facility: CLINIC | Age: 25
End: 2020-01-16
Payer: COMMERCIAL

## 2020-01-16 VITALS
HEART RATE: 97 BPM | WEIGHT: 110 LBS | TEMPERATURE: 98.1 F | BODY MASS INDEX: 18.78 KG/M2 | RESPIRATION RATE: 12 BRPM | HEIGHT: 64 IN | DIASTOLIC BLOOD PRESSURE: 71 MMHG | OXYGEN SATURATION: 99 % | SYSTOLIC BLOOD PRESSURE: 106 MMHG

## 2020-01-16 DIAGNOSIS — R05 COUGH: ICD-10-CM

## 2020-01-16 PROCEDURE — 99214 OFFICE O/P EST MOD 30 MIN: CPT

## 2020-01-23 LAB
25(OH)D3 SERPL-MCNC: 37.6 NG/ML
HAV IGM SER QL: NONREACTIVE

## 2020-02-09 NOTE — PHYSICAL EXAM
[No Acute Distress] : no acute distress [Well Nourished] : well nourished [Well Developed] : well developed [Well-Appearing] : well-appearing [Normal Sclera/Conjunctiva] : normal sclera/conjunctiva [EOMI] : extraocular movements intact [Normal Outer Ear/Nose] : the outer ears and nose were normal in appearance [Normal TMs] : both tympanic membranes were normal [No Lymphadenopathy] : no lymphadenopathy [Supple] : supple [No JVD] : no jugular venous distention [No Respiratory Distress] : no respiratory distress  [Thyroid Normal, No Nodules] : the thyroid was normal and there were no nodules present [Normal Rate] : normal rate  [No Accessory Muscle Use] : no accessory muscle use [Normal S1, S2] : normal S1 and S2 [Regular Rhythm] : with a regular rhythm [No Murmur] : no murmur heard [No Carotid Bruits] : no carotid bruits [No Edema] : there was no peripheral edema [Soft] : abdomen soft [Non-distended] : non-distended [No Masses] : no abdominal mass palpated [Non Tender] : non-tender [Normal Bowel Sounds] : normal bowel sounds [No HSM] : no HSM [Normal Posterior Cervical Nodes] : no posterior cervical lymphadenopathy [Normal Anterior Cervical Nodes] : no anterior cervical lymphadenopathy [No CVA Tenderness] : no CVA  tenderness [No Joint Swelling] : no joint swelling [No Spinal Tenderness] : no spinal tenderness [Grossly Normal Strength/Tone] : grossly normal strength/tone [Coordination Grossly Intact] : coordination grossly intact [No Rash] : no rash [Normal Gait] : normal gait [No Focal Deficits] : no focal deficits [Normal Affect] : the affect was normal [Normal Insight/Judgement] : insight and judgment were intact [de-identified] : + coarse B.S  [de-identified] : + throat erythema, no exudate

## 2020-02-09 NOTE — HISTORY OF PRESENT ILLNESS
[de-identified] : 25 year old female presents with acute URI x 2 weeks> + sore throat, cough productive of yellow sputum , denies associated fever, chills,SOB. Symptoms getting worse at night. She takes OTC decongestant without much improvement

## 2020-08-11 ENCOUNTER — APPOINTMENT (OUTPATIENT)
Dept: INTERNAL MEDICINE | Facility: CLINIC | Age: 25
End: 2020-08-11

## 2020-08-17 ENCOUNTER — APPOINTMENT (OUTPATIENT)
Dept: INTERNAL MEDICINE | Facility: CLINIC | Age: 25
End: 2020-08-17
Payer: COMMERCIAL

## 2020-08-17 VITALS
TEMPERATURE: 97.7 F | WEIGHT: 113 LBS | SYSTOLIC BLOOD PRESSURE: 101 MMHG | HEART RATE: 88 BPM | RESPIRATION RATE: 12 BRPM | DIASTOLIC BLOOD PRESSURE: 66 MMHG | OXYGEN SATURATION: 96 %

## 2020-08-17 PROCEDURE — 99214 OFFICE O/P EST MOD 30 MIN: CPT

## 2020-08-17 PROCEDURE — 99395 PREV VISIT EST AGE 18-39: CPT | Mod: 25

## 2020-08-17 NOTE — HISTORY OF PRESENT ILLNESS
[de-identified] : Ms. KAT SANDERS is a 25 year old female, with hx of depression, anxiety, presents for annual physical\par She is doing well. She is on Effexor 150mg daily. She stopped seeing psychiatry about 2 years ago \par Denies SOB, chest pain, abdominal pain, N/V/D, leg swelling, headache, dizziness, suicidal ideations, intent to hurt herself or others \par Offers no complaints\par \par \par

## 2020-08-17 NOTE — ASSESSMENT
[FreeTextEntry1] : Physical\par She is UTD with her PAP-pt states she had it earlier this year\par blood work ordered\par \par Hx of  Anxiety\par cont effexor 150mg\par \par follow up in one week for lab results

## 2020-08-24 LAB
25(OH)D3 SERPL-MCNC: 81.8 NG/ML
ABO + RH PNL BLD: NORMAL
ALBUMIN SERPL ELPH-MCNC: 5.2 G/DL
ALP BLD-CCNC: 57 U/L
ALT SERPL-CCNC: 16 U/L
ANION GAP SERPL CALC-SCNC: 11 MMOL/L
APPEARANCE: CLEAR
AST SERPL-CCNC: 20 U/L
BASOPHILS # BLD AUTO: 0.08 K/UL
BASOPHILS NFR BLD AUTO: 1.4 %
BILIRUB SERPL-MCNC: 0.3 MG/DL
BILIRUBIN URINE: NEGATIVE
BLOOD URINE: NEGATIVE
BUN SERPL-MCNC: 11 MG/DL
C TRACH RRNA SPEC QL NAA+PROBE: NOT DETECTED
CALCIUM SERPL-MCNC: 10.4 MG/DL
CHLORIDE SERPL-SCNC: 102 MMOL/L
CHOLEST SERPL-MCNC: 169 MG/DL
CHOLEST/HDLC SERPL: 1.9 RATIO
CO2 SERPL-SCNC: 27 MMOL/L
COLOR: COLORLESS
CREAT SERPL-MCNC: 0.72 MG/DL
EOSINOPHIL # BLD AUTO: 0.11 K/UL
EOSINOPHIL NFR BLD AUTO: 1.9 %
ESTIMATED AVERAGE GLUCOSE: 100 MG/DL
GLUCOSE QUALITATIVE U: NEGATIVE
GLUCOSE SERPL-MCNC: 68 MG/DL
HAV IGM SER QL: NONREACTIVE
HBA1C MFR BLD HPLC: 5.1 %
HBV CORE IGM SER QL: NONREACTIVE
HBV SURFACE AG SER QL: NONREACTIVE
HCT VFR BLD CALC: 40.8 %
HCV AB SER QL: NONREACTIVE
HCV S/CO RATIO: 0.21 S/CO
HDLC SERPL-MCNC: 88 MG/DL
HGB BLD-MCNC: 12.9 G/DL
HIV1+2 AB SPEC QL IA.RAPID: NONREACTIVE
HSV 1+2 IGG SER IA-IMP: NEGATIVE
HSV 1+2 IGG SER IA-IMP: NEGATIVE
HSV1 IGG SER QL: 0.12 INDEX
HSV1 IGM SER QL: NORMAL TITER
HSV2 AB FLD-ACNC: NORMAL TITER
HSV2 IGG SER QL: 0.23 INDEX
IMM GRANULOCYTES NFR BLD AUTO: 0.2 %
KETONES URINE: NEGATIVE
LDLC SERPL CALC-MCNC: 66 MG/DL
LEUKOCYTE ESTERASE URINE: NEGATIVE
LYMPHOCYTES # BLD AUTO: 1.65 K/UL
LYMPHOCYTES NFR BLD AUTO: 28.2 %
MAN DIFF?: NORMAL
MCHC RBC-ENTMCNC: 29.5 PG
MCHC RBC-ENTMCNC: 31.6 GM/DL
MCV RBC AUTO: 93.2 FL
MONOCYTES # BLD AUTO: 0.56 K/UL
MONOCYTES NFR BLD AUTO: 9.6 %
N GONORRHOEA RRNA SPEC QL NAA+PROBE: NOT DETECTED
NEUTROPHILS # BLD AUTO: 3.45 K/UL
NEUTROPHILS NFR BLD AUTO: 58.7 %
NITRITE URINE: NEGATIVE
PH URINE: 7
PLATELET # BLD AUTO: 304 K/UL
POTASSIUM SERPL-SCNC: 5.3 MMOL/L
PROT SERPL-MCNC: 7.7 G/DL
PROTEIN URINE: NEGATIVE
RBC # BLD: 4.38 M/UL
RBC # FLD: 12.7 %
SARS-COV-2 IGG SERPL IA-ACNC: 0.01 INDEX
SARS-COV-2 IGG SERPL QL IA: NEGATIVE
SODIUM SERPL-SCNC: 140 MMOL/L
SOURCE TP AMPLIFICATION: NORMAL
SPECIFIC GRAVITY URINE: 1.01
T PALLIDUM AB SER QL IA: NEGATIVE
TRIGL SERPL-MCNC: 72 MG/DL
TSH SERPL-ACNC: 2.55 UIU/ML
UROBILINOGEN URINE: NORMAL
WBC # FLD AUTO: 5.86 K/UL

## 2020-08-27 ENCOUNTER — APPOINTMENT (OUTPATIENT)
Dept: INTERNAL MEDICINE | Facility: CLINIC | Age: 25
End: 2020-08-27
Payer: COMMERCIAL

## 2020-08-27 VITALS
DIASTOLIC BLOOD PRESSURE: 71 MMHG | HEART RATE: 94 BPM | OXYGEN SATURATION: 99 % | SYSTOLIC BLOOD PRESSURE: 111 MMHG | TEMPERATURE: 97.3 F | RESPIRATION RATE: 12 BRPM | WEIGHT: 113 LBS

## 2020-08-27 LAB — VIT B12 SERPL-MCNC: 1229 PG/ML

## 2020-08-27 PROCEDURE — 99214 OFFICE O/P EST MOD 30 MIN: CPT

## 2020-08-27 NOTE — HISTORY OF PRESENT ILLNESS
[FreeTextEntry8] : Ms. KAT SANDERS is a 25 year old female presents complaining of sore throat, pain when swallowing\par Pt states that 2 days ago her throat started hurting. Yesterday she started seeing "white spots" in her tonsills. Pt says she is prone to getting throat infections.\par Denies fever chills, cough, ear ache, SOB, chest pain, abdominal pain, N/V/D, leg swelling, headache, dizziness \par \par \par \par

## 2020-08-27 NOTE — PHYSICAL EXAM
[Normal Sclera/Conjunctiva] : normal sclera/conjunctiva [Normal Outer Ear/Nose] : the outer ears and nose were normal in appearance [No JVD] : no jugular venous distention [Supple] : supple [Normal] : no respiratory distress, lungs were clear to auscultation bilaterally and no accessory muscle use [No Edema] : there was no peripheral edema [No Extremity Clubbing/Cyanosis] : no extremity clubbing/cyanosis [Soft] : abdomen soft [Non Tender] : non-tender [No CVA Tenderness] : no CVA  tenderness [Non-distended] : non-distended [No Rash] : no rash [No Joint Swelling] : no joint swelling [No Focal Deficits] : no focal deficits [Coordination Grossly Intact] : coordination grossly intact [Normal Gait] : normal gait [Speech Grossly Normal] : speech grossly normal [Alert and Oriented x3] : oriented to person, place, and time [Normal Affect] : the affect was normal [Normal Insight/Judgement] : insight and judgment were intact [de-identified] : + PND, + exudates b/l tonsils  [de-identified] : + tender anterior cervical nodes

## 2020-08-30 LAB — BACTERIA THROAT CULT: NORMAL

## 2020-10-27 ENCOUNTER — APPOINTMENT (OUTPATIENT)
Dept: OTOLARYNGOLOGY | Facility: CLINIC | Age: 25
End: 2020-10-27

## 2020-12-21 PROBLEM — Z87.09 HISTORY OF ACUTE SINUSITIS: Status: RESOLVED | Noted: 2019-04-11 | Resolved: 2020-12-21

## 2020-12-21 PROBLEM — Z87.09 HISTORY OF ACUTE PHARYNGITIS: Status: RESOLVED | Noted: 2019-07-17 | Resolved: 2020-12-21

## 2021-05-03 ENCOUNTER — APPOINTMENT (OUTPATIENT)
Dept: INTERNAL MEDICINE | Facility: CLINIC | Age: 26
End: 2021-05-03
Payer: COMMERCIAL

## 2021-05-03 ENCOUNTER — APPOINTMENT (OUTPATIENT)
Dept: ORTHOPEDIC SURGERY | Facility: CLINIC | Age: 26
End: 2021-05-03
Payer: COMMERCIAL

## 2021-05-03 VITALS
HEIGHT: 64 IN | HEART RATE: 96 BPM | DIASTOLIC BLOOD PRESSURE: 75 MMHG | BODY MASS INDEX: 20.49 KG/M2 | WEIGHT: 120 LBS | SYSTOLIC BLOOD PRESSURE: 111 MMHG

## 2021-05-03 VITALS
HEART RATE: 114 BPM | OXYGEN SATURATION: 100 % | HEIGHT: 64 IN | RESPIRATION RATE: 12 BRPM | SYSTOLIC BLOOD PRESSURE: 113 MMHG | WEIGHT: 118 LBS | BODY MASS INDEX: 20.14 KG/M2 | TEMPERATURE: 98.2 F | DIASTOLIC BLOOD PRESSURE: 78 MMHG

## 2021-05-03 DIAGNOSIS — M25.552 PAIN IN LEFT HIP: ICD-10-CM

## 2021-05-03 DIAGNOSIS — Z00.00 ENCOUNTER FOR GENERAL ADULT MEDICAL EXAMINATION W/OUT ABNORMAL FINDINGS: ICD-10-CM

## 2021-05-03 PROCEDURE — 99395 PREV VISIT EST AGE 18-39: CPT

## 2021-05-03 PROCEDURE — 99072 ADDL SUPL MATRL&STAF TM PHE: CPT

## 2021-05-03 PROCEDURE — 73501 X-RAY EXAM HIP UNI 1 VIEW: CPT | Mod: LT

## 2021-05-03 PROCEDURE — 99214 OFFICE O/P EST MOD 30 MIN: CPT

## 2021-05-03 NOTE — DISCUSSION/SUMMARY
[de-identified] : 27 y/o female with left hip pain. \par \par On clinical examination patient has positive impingement signs with groin pain and anterior joint pain.  Symptoms may be consistent with labral irritation through the anterior hip.  Discussed short-term and long-term outcomes as well as the goal of treatment to reduce pain and restore function. Nonsurgical treatment is typically first-line therapy that may take weeks to months to resolve symptoms; includes rest, NSAIDs, home stretching and program versus physical therapy to restore normal strength/ROM/function of the hip. Further treatment may include intra-articular hip injection for therapeutic and diagnostic purposes. I also discussed the role of arthroscopic surgical intervention when nonsurgical treatment does not adequately relieve pain/inflammation.\par \par MRI Rx given to patient to further delineate any internal structural derangement to the hip. This will allow for better understanding of the severity of disease, and allow for better stratification of treatment strategies (surgical vs. non-surgical). Patient is agreeable to further imaging. \par \par Recommendations: Conservative modalities as above (overhead activity rest/avoidance, ice, NSAIDs, strengthening and stretching program). \par \par Followup: After MRI

## 2021-05-03 NOTE — PHYSICAL EXAM
[de-identified] : Oriented to time, place, person\par Mood: Normal\par Affect: Normal\par Appearance: Healthy, well appearing, no acute distress.\par Gait: Normal\par Assistive Devices: None\par \par Left Hip Exam:\par \par Skin: Clean, dry, intact\par Inspection: No obvious deformity, no swelling.\par Pulses: 2+ DP/PT pulses \par ROM: 0-110 flexion. Internal rotation to 5-10. External rotation to 70. \par Painful ROM:+ flexion pain, +IR pain , + groin pain. +mild pain with ADD\par Tenderness: +anterior hip joint pain. No tenderness over greater trochanter. No tenderness at gluteal insertion. No paraspinal tenderness. No axial lumbar tenderness. No sacroiliac tenderness. No TTP over the ASIS/Iliac crest.\par Strength: 5/5 hip flexion, 5/5 gluteal strength, 5/5 hip ADD with pain, 5/5 hip ABD, 5/5 Q/H, 5/5 TA/GS/EHL\par Neuro: Sensation intact to light touch throughout, DTR normal\par Additional tests: + impingement test, Negative LUCHO  [de-identified] : The following radiographs were ordered and read by me during this patients visit. I reviewed each radiograph in detail with the patient and discussed the findings as highlighted below. \par \par AP pelvis and lateral view of the left hip were obtained today, 05/03/2021, that show no acute bony injury, including fracture or dislocation. There is no hip degenerative change seen. There is no gross pelvic of hip malalignment. No significant other obvious osseous abnormality, otherwise unremarkable.

## 2021-05-03 NOTE — ADDENDUM
[FreeTextEntry1] : This note was written by Orly Palmer on 05/03/2021 acting solely as a scribe for Dr. Ba Villa.\par \par All medical record entries made by the Scribe were at my, Dr. Ba Villa, direction and personally dictated by me on 05/03/2021. I have personally reviewed the chart and agree that the record accurately reflects my personal performance of the history, physical exam, assessment and plan.

## 2021-05-03 NOTE — HISTORY OF PRESENT ILLNESS
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 She felt a pop while using leg press machine at the gym. Prior to the pop, she did feel like an impingement of the hip but she ignored it and kept working out. The pain is constant worse hip flexion and rotation. She is not taking pain medication. Reports burning groin pain. She stopped activity due to pain. States that stretch Denies prior sx or injuries,  numbness or tingling.

## 2021-05-10 LAB
25(OH)D3 SERPL-MCNC: 48.2 NG/ML
ALBUMIN SERPL ELPH-MCNC: 5.2 G/DL
ALP BLD-CCNC: 68 U/L
ALT SERPL-CCNC: 18 U/L
ANION GAP SERPL CALC-SCNC: 19 MMOL/L
AST SERPL-CCNC: 21 U/L
BILIRUB SERPL-MCNC: 0.2 MG/DL
BUN SERPL-MCNC: 16 MG/DL
C TRACH RRNA SPEC QL NAA+PROBE: NOT DETECTED
CALCIUM SERPL-MCNC: 10.2 MG/DL
CHLORIDE SERPL-SCNC: 100 MMOL/L
CHOLEST SERPL-MCNC: 169 MG/DL
CO2 SERPL-SCNC: 21 MMOL/L
CREAT SERPL-MCNC: 0.69 MG/DL
GLUCOSE SERPL-MCNC: 74 MG/DL
HAV IGM SER QL: NONREACTIVE
HBV CORE IGM SER QL: NONREACTIVE
HBV SURFACE AG SER QL: NONREACTIVE
HCV AB SER QL: NONREACTIVE
HCV S/CO RATIO: 0.23 S/CO
HDLC SERPL-MCNC: 92 MG/DL
HIV1+2 AB SPEC QL IA.RAPID: NONREACTIVE
HSV 1+2 IGG SER IA-IMP: NEGATIVE
HSV 1+2 IGG SER IA-IMP: NEGATIVE
HSV1 IGG SER QL: 0.45 INDEX
HSV1 IGM SER QL: NORMAL TITER
HSV2 AB FLD-ACNC: NORMAL TITER
HSV2 IGG SER QL: 0.21 INDEX
LDLC SERPL CALC-MCNC: 64 MG/DL
M TB IFN-G BLD-IMP: NEGATIVE
MEV IGG FLD QL IA: 198 AU/ML
MEV IGG+IGM SER-IMP: POSITIVE
MUV AB SER-ACNC: POSITIVE
MUV IGG SER QL IA: >300 AU/ML
N GONORRHOEA RRNA SPEC QL NAA+PROBE: NOT DETECTED
NONHDLC SERPL-MCNC: 77 MG/DL
POTASSIUM SERPL-SCNC: 5 MMOL/L
PROT SERPL-MCNC: 7.8 G/DL
QUANTIFERON TB PLUS MITOGEN MINUS NIL: 7.34 IU/ML
QUANTIFERON TB PLUS NIL: 0.02 IU/ML
QUANTIFERON TB PLUS TB1 MINUS NIL: 0 IU/ML
QUANTIFERON TB PLUS TB2 MINUS NIL: 0 IU/ML
RUBV IGG FLD-ACNC: 13.2 INDEX
RUBV IGG SER-IMP: POSITIVE
SODIUM SERPL-SCNC: 140 MMOL/L
SOURCE TP AMPLIFICATION: NORMAL
T PALLIDUM AB SER QL IA: NEGATIVE
TRIGL SERPL-MCNC: 69 MG/DL
TSH SERPL-ACNC: 2.02 UIU/ML
VIT B12 SERPL-MCNC: 981 PG/ML
VZV AB TITR SER: POSITIVE
VZV IGG SER IF-ACNC: 1935 INDEX

## 2021-05-10 NOTE — HEALTH RISK ASSESSMENT
[Yes] : Yes [Patient reported PAP Smear was normal] : Patient reported PAP Smear was normal [With Family] : lives with family [Student] : student [Single] : single [Sexually Active] : sexually active [] : No [de-identified] : socially [PapSmearDate] : 6 months ago

## 2021-05-10 NOTE — ASSESSMENT
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 Wants to get the vaccine, however she is scheduled for her second pfizer COVID vaccine May 16th. We will therefore hold off on starting the Hep B vaccine series until 90 days have passed from the day of administration of the second COVID vaccine\par \par \par follow up in one week for lab results\par

## 2021-05-10 NOTE — HISTORY OF PRESENT ILLNESS
[de-identified] : Ms. KAT SANDERS is a 26 year old female presents for annual physical, forms to be filled out for school\par She is doing well. \par Denies SOB, chest pain, abdominal pain, N/V/D, leg swelling, headache, dizziness \par Offers no complaints\par

## 2021-05-11 ENCOUNTER — APPOINTMENT (OUTPATIENT)
Dept: INTERNAL MEDICINE | Facility: CLINIC | Age: 26
End: 2021-05-11
Payer: COMMERCIAL

## 2021-05-11 VITALS
TEMPERATURE: 98.4 F | SYSTOLIC BLOOD PRESSURE: 99 MMHG | HEART RATE: 98 BPM | DIASTOLIC BLOOD PRESSURE: 65 MMHG | WEIGHT: 118 LBS | BODY MASS INDEX: 20.14 KG/M2 | HEIGHT: 64 IN | OXYGEN SATURATION: 100 %

## 2021-05-11 DIAGNOSIS — Z78.9 OTHER SPECIFIED HEALTH STATUS: ICD-10-CM

## 2021-05-11 PROCEDURE — 99214 OFFICE O/P EST MOD 30 MIN: CPT

## 2021-05-11 PROCEDURE — 99072 ADDL SUPL MATRL&STAF TM PHE: CPT

## 2021-05-17 ENCOUNTER — APPOINTMENT (OUTPATIENT)
Dept: MRI IMAGING | Facility: CLINIC | Age: 26
End: 2021-05-17

## 2021-05-18 ENCOUNTER — APPOINTMENT (OUTPATIENT)
Dept: ORTHOPEDIC SURGERY | Facility: CLINIC | Age: 26
End: 2021-05-18
Payer: COMMERCIAL

## 2021-05-18 DIAGNOSIS — S73.192A OTHER SPRAIN OF LEFT HIP, INITIAL ENCOUNTER: ICD-10-CM

## 2021-05-18 PROCEDURE — 99214 OFFICE O/P EST MOD 30 MIN: CPT

## 2021-05-18 PROCEDURE — 99072 ADDL SUPL MATRL&STAF TM PHE: CPT

## 2021-05-19 LAB — HBV SURFACE AB SERPL IA-ACNC: <3 MIU/ML

## 2021-05-20 PROBLEM — S73.192A TEAR OF LEFT ACETABULAR LABRUM, INITIAL ENCOUNTER: Status: ACTIVE | Noted: 2021-05-20

## 2021-05-20 NOTE — HISTORY OF PRESENT ILLNESS
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 She has obtained MRI and is here for review of results. States that her pain has become worse since last visit. She felt a pop while using leg press machine at the gym. Prior to the pop, she did feel like an impingement of the hip but she ignored it and kept working out. The pain is constant worse hip flexion and rotation. She is not taking pain medication. Reports burning groin pain. She stopped activity due to pain. States that stretch Denies prior sx or injuries,  numbness or tingling.

## 2021-05-20 NOTE — DISCUSSION/SUMMARY
[de-identified] : 25 y/o female with left femoral acetabular impingement.\par \par On clinical examination patient has positive impingement signs with groin pain and anterior joint pain consistent with femoral acetabular impingement.  MRI shows mild fraying of the left acetabular labrum without labral tear.   Discussion was had with the patient regarding this is a common cause of early hip dysfunction and secondary osteoarthritis. Typically refers to femoral versus acetabular based impingement of the femoral neck at the acetabular rim, which can limit hip motion, damaged cartilage/labrum, and advance osteoarthritis.\par \par Discussed short-term and long-term outcomes as well as the goal of treatment to reduce pain and restore function. Nonsurgical treatment is typically first-line therapy that may take weeks to months to resolve symptoms; includes rest, NSAIDs, home stretching and program versus physical therapy to restore normal strength/ROM/function of the hip. Further treatment may include intra-articular hip injection for therapeutic and diagnostic purposes. I also discussed the role of arthroscopic surgical intervention when nonsurgical treatment does not adequately relieve pain/inflammation.\par \par Recommendations: Begin trial of PT, Rx given. Conservative care & observation, this includes rest/activity avoidance until less symptomatic with subsequent gradual return to full activity as tolerated. Patient may also use OTC NSAID's or acetaminophen as tolerated, with application of ice to the area 2-3x daily for 20 minutes after periods of activity. \par \par Follow-up as needed.

## 2021-05-20 NOTE — REASON FOR VISIT
[Initial Visit] : an initial visit for [Follow-Up Visit] : a follow-up visit for [FreeTextEntry2] : hip pain

## 2021-05-20 NOTE — PHYSICAL EXAM
[de-identified] : Oriented to time, place, person\par Mood: Normal\par Affect: Normal\par Appearance: Healthy, well appearing, no acute distress.\par Gait: Normal\par Assistive Devices: None\par \par Left Hip Exam:\par \par Skin: Clean, dry, intact\par Inspection: No obvious deformity, no swelling.\par Pulses: 2+ DP/PT pulses \par ROM: 0-110 flexion. Internal rotation to 5-10. External rotation to 70. \par Painful ROM:+ flexion pain, +IR pain , + groin pain. +mild pain with ADD\par Tenderness: +anterior hip joint pain. No tenderness over greater trochanter. No tenderness at gluteal insertion. No paraspinal tenderness. No axial lumbar tenderness. No sacroiliac tenderness. No TTP over the ASIS/Iliac crest.\par Strength: 5/5 hip flexion, 5/5 gluteal strength, 5/5 hip ADD with pain, 5/5 hip ABD, 5/5 Q/H, 5/5 TA/GS/EHL\par Neuro: Sensation intact to light touch throughout, DTR normal\par Additional tests: + impingement test, Negative LUCHO  [de-identified] : AP pelvis and lateral view of the left hip were obtained, that show no acute bony injury, including fracture or dislocation. There is no hip degenerative change seen. There is no gross pelvic of hip malalignment. No significant other obvious osseous abnormality, otherwise unremarkable. \par \par MRI of left hip dated 5/10/2021 shows mild fraying of the left acetabular labrum without chad displaced labral tear.

## 2021-05-20 NOTE — ADDENDUM
[FreeTextEntry1] : This note was written by Orly Palmer on 05/18/2021 acting solely as a scribe for Dr. Ba Villa.\par \par All medical record entries made by the Scribe were at my, Dr. Ba Villa, direction and personally dictated by me on 05/18/2021. I have personally reviewed the chart and agree that the record accurately reflects my personal performance of the history, physical exam, assessment and plan.

## 2021-06-01 NOTE — ASSESSMENT
[FreeTextEntry1] : \par Lab results reviewed and discussed with patient\par Pt is not immune to hep B. Wants to get the vaccine, however she is scheduled for her second pfizer COVID vaccine May 16th. We will therefore hold off on starting the Hep B vaccine series until 90 days have passed from the day of administration of the second COVID vaccine\par \par rest of blood work is normal\par forms filled out, copies of labs given to pt\par

## 2021-06-01 NOTE — PHYSICAL EXAM
[Normal Sclera/Conjunctiva] : normal sclera/conjunctiva [Normal Outer Ear/Nose] : the outer ears and nose were normal in appearance [No JVD] : no jugular venous distention [No Edema] : there was no peripheral edema [Soft] : abdomen soft [Non Tender] : non-tender [Non-distended] : non-distended [Normal Anterior Cervical Nodes] : no anterior cervical lymphadenopathy [No CVA Tenderness] : no CVA  tenderness [No Joint Swelling] : no joint swelling [No Rash] : no rash [Normal Gait] : normal gait [Speech Grossly Normal] : speech grossly normal [Normal] : affect was normal and insight and judgment were intact

## 2021-06-01 NOTE — HISTORY OF PRESENT ILLNESS
[de-identified] : Ms. KAT SANDERS is a 26 year old female presents for follow up visit to discuss lab results\par She is doing well. \par Denies SOB, chest pain, abdominal pain, N/V/D, leg swelling, headache, dizziness \par Offers no complaints\par \par \par

## 2021-11-08 ENCOUNTER — APPOINTMENT (OUTPATIENT)
Dept: INTERNAL MEDICINE | Facility: CLINIC | Age: 26
End: 2021-11-08
Payer: COMMERCIAL

## 2021-11-08 ENCOUNTER — MED ADMIN CHARGE (OUTPATIENT)
Age: 26
End: 2021-11-08

## 2021-11-08 PROCEDURE — 90746 HEPB VACCINE 3 DOSE ADULT IM: CPT

## 2021-11-08 PROCEDURE — 90471 IMMUNIZATION ADMIN: CPT

## 2021-11-10 ENCOUNTER — APPOINTMENT (OUTPATIENT)
Dept: INTERNAL MEDICINE | Facility: CLINIC | Age: 26
End: 2021-11-10
Payer: COMMERCIAL

## 2021-11-10 VITALS
HEART RATE: 96 BPM | TEMPERATURE: 96.8 F | WEIGHT: 123 LBS | RESPIRATION RATE: 12 BRPM | BODY MASS INDEX: 21 KG/M2 | OXYGEN SATURATION: 98 % | HEIGHT: 64 IN | DIASTOLIC BLOOD PRESSURE: 74 MMHG | SYSTOLIC BLOOD PRESSURE: 114 MMHG

## 2021-11-10 DIAGNOSIS — T78.40XA ALLERGY, UNSPECIFIED, INITIAL ENCOUNTER: ICD-10-CM

## 2021-11-10 PROCEDURE — 99214 OFFICE O/P EST MOD 30 MIN: CPT

## 2021-11-10 NOTE — HISTORY OF PRESENT ILLNESS
[de-identified] : 26 year old female presents for evaluation of allergic reaction. Pt had Hep B # 1  on Monday > developed swollen face , erythematous itchy rash on her face   on Tuesday. She  had new pancake mix high in protein  Tuesday morning . Pt took Benadryl with some improvement in itchiness . She denies SOB/CP, tongue swelling , difficulty swallowing .

## 2021-11-10 NOTE — PHYSICAL EXAM
[No Acute Distress] : no acute distress [Well Nourished] : well nourished [Well Developed] : well developed [Well-Appearing] : well-appearing [Normal Sclera/Conjunctiva] : normal sclera/conjunctiva [EOMI] : extraocular movements intact [Normal Outer Ear/Nose] : the outer ears and nose were normal in appearance [Normal Oropharynx] : the oropharynx was normal [Normal TMs] : both tympanic membranes were normal [No JVD] : no jugular venous distention [No Lymphadenopathy] : no lymphadenopathy [Supple] : supple [No Respiratory Distress] : no respiratory distress  [No Accessory Muscle Use] : no accessory muscle use [Clear to Auscultation] : lungs were clear to auscultation bilaterally [Normal Rate] : normal rate  [Regular Rhythm] : with a regular rhythm [Normal S1, S2] : normal S1 and S2 [No Murmur] : no murmur heard [Pedal Pulses Present] : the pedal pulses are present [No Edema] : there was no peripheral edema [Soft] : abdomen soft [Non Tender] : non-tender [Non-distended] : non-distended [No Masses] : no abdominal mass palpated [No HSM] : no HSM [Normal Bowel Sounds] : normal bowel sounds [Normal Posterior Cervical Nodes] : no posterior cervical lymphadenopathy [Normal Anterior Cervical Nodes] : no anterior cervical lymphadenopathy [No CVA Tenderness] : no CVA  tenderness [No Spinal Tenderness] : no spinal tenderness [No Joint Swelling] : no joint swelling [Grossly Normal Strength/Tone] : grossly normal strength/tone [Coordination Grossly Intact] : coordination grossly intact [No Focal Deficits] : no focal deficits [Normal Gait] : normal gait [Deep Tendon Reflexes (DTR)] : deep tendon reflexes were 2+ and symmetric [Normal Affect] : the affect was normal [Normal Insight/Judgement] : insight and judgment were intact [de-identified] : +mild  face swelling/ erythema

## 2021-12-10 ENCOUNTER — APPOINTMENT (OUTPATIENT)
Dept: INTERNAL MEDICINE | Facility: CLINIC | Age: 26
End: 2021-12-10

## 2022-02-02 ENCOUNTER — TRANSCRIPTION ENCOUNTER (OUTPATIENT)
Age: 27
End: 2022-02-02

## 2022-02-02 ENCOUNTER — APPOINTMENT (OUTPATIENT)
Dept: OBGYN | Facility: CLINIC | Age: 27
End: 2022-02-02
Payer: MEDICAID

## 2022-02-02 VITALS
SYSTOLIC BLOOD PRESSURE: 122 MMHG | HEART RATE: 82 BPM | OXYGEN SATURATION: 99 % | WEIGHT: 119 LBS | DIASTOLIC BLOOD PRESSURE: 77 MMHG | TEMPERATURE: 96.3 F | HEIGHT: 64 IN | RESPIRATION RATE: 12 BRPM | BODY MASS INDEX: 20.32 KG/M2

## 2022-02-02 DIAGNOSIS — B37.3 CANDIDIASIS OF VULVA AND VAGINA: ICD-10-CM

## 2022-02-02 PROCEDURE — 99204 OFFICE O/P NEW MOD 45 MIN: CPT

## 2022-02-02 NOTE — DISCUSSION/SUMMARY
[FreeTextEntry1] : [] affirm\par [] empiric treatment with diflucan po as refractory to vaginal antifungal cram. lotrisone cream. \par [] suppression therapy offered. pt would like to think about it\par Bill PAUL

## 2022-02-02 NOTE — HISTORY OF PRESENT ILLNESS
[FreeTextEntry1] : 26yo P0 LMP 12/16/21 here for vaginitis.\par started few days ago, took OTC monistat without relief.\par hx of recurrent yeast (6x/yr) dx by previous gyn. never had suppression therapy.\par \par sexually active w/ male partner.\par hx of imperforated hymen, s/p hymenectomy 2019. dx of vaginismus by previous gyn (Dr. Saleh). \par \par works as dental assistant.

## 2022-02-02 NOTE — PHYSICAL EXAM
[Labia Majora] : normal [Labia Minora] : normal [Discharge] : a  ~M vaginal discharge was present [Scant] : scant [White] : white [Cheesy] : cheesy [Normal] : normal [Uterine Adnexae] : normal

## 2022-02-04 LAB
CANDIDA VAG CYTO: NOT DETECTED
G VAGINALIS+PREV SP MTYP VAG QL MICRO: NOT DETECTED
T VAGINALIS VAG QL WET PREP: NOT DETECTED

## 2022-03-17 ENCOUNTER — APPOINTMENT (OUTPATIENT)
Dept: OBGYN | Facility: CLINIC | Age: 27
End: 2022-03-17

## 2022-04-01 ENCOUNTER — APPOINTMENT (OUTPATIENT)
Dept: OBGYN | Facility: CLINIC | Age: 27
End: 2022-04-01

## 2022-04-06 ENCOUNTER — APPOINTMENT (OUTPATIENT)
Dept: INTERNAL MEDICINE | Facility: CLINIC | Age: 27
End: 2022-04-06
Payer: MEDICAID

## 2022-04-06 ENCOUNTER — NON-APPOINTMENT (OUTPATIENT)
Age: 27
End: 2022-04-06

## 2022-04-06 VITALS
TEMPERATURE: 97.6 F | HEIGHT: 64 IN | WEIGHT: 121.68 LBS | HEART RATE: 80 BPM | BODY MASS INDEX: 20.77 KG/M2 | OXYGEN SATURATION: 99 % | SYSTOLIC BLOOD PRESSURE: 111 MMHG | DIASTOLIC BLOOD PRESSURE: 73 MMHG

## 2022-04-06 DIAGNOSIS — N39.0 URINARY TRACT INFECTION, SITE NOT SPECIFIED: ICD-10-CM

## 2022-04-06 DIAGNOSIS — R11.0 NAUSEA: ICD-10-CM

## 2022-04-06 PROCEDURE — 99214 OFFICE O/P EST MOD 30 MIN: CPT | Mod: 25

## 2022-04-06 RX ORDER — DOXYCYCLINE HYCLATE 100 MG/1
100 TABLET ORAL
Qty: 14 | Refills: 0 | Status: DISCONTINUED | COMMUNITY
Start: 2020-01-16 | End: 2022-04-06

## 2022-04-06 RX ORDER — PREDNISONE 10 MG/1
10 TABLET ORAL
Qty: 3 | Refills: 0 | Status: DISCONTINUED | COMMUNITY
Start: 2021-11-10 | End: 2022-04-06

## 2022-04-06 RX ORDER — BENZONATATE 200 MG/1
200 CAPSULE ORAL
Qty: 20 | Refills: 0 | Status: DISCONTINUED | COMMUNITY
Start: 2020-01-16 | End: 2022-04-06

## 2022-04-06 RX ORDER — AZITHROMYCIN 250 MG/1
250 TABLET, FILM COATED ORAL
Qty: 6 | Refills: 0 | Status: DISCONTINUED | COMMUNITY
Start: 2020-08-27 | End: 2022-04-06

## 2022-04-06 NOTE — ADDENDUM
[FreeTextEntry1] : I, Wolfgang Land, acted as a scribe on behalf of Dr. Jose Daniel Ghosh MD, on 04/06/2022. \par \par All medical entries made by the scribe were at my, Dr. Jose Daniel Ghosh MD, direction and personally dictated by me on 04/06/2022. I have reviewed the chart and agree that the record accurately reflects my personal performance of the history, physical exam, assessment and plan. I have also personally directed, reviewed, and agreed with the chart.

## 2022-04-06 NOTE — PHYSICAL EXAM
[No Acute Distress] : no acute distress [Well Nourished] : well nourished [Well Developed] : well developed [Well-Appearing] : well-appearing [Normal Sclera/Conjunctiva] : normal sclera/conjunctiva [PERRL] : pupils equal round and reactive to light [EOMI] : extraocular movements intact [Normal Outer Ear/Nose] : the outer ears and nose were normal in appearance [Normal Oropharynx] : the oropharynx was normal [No JVD] : no jugular venous distention [No Lymphadenopathy] : no lymphadenopathy [Supple] : supple [Thyroid Normal, No Nodules] : the thyroid was normal and there were no nodules present [No Respiratory Distress] : no respiratory distress  [No Accessory Muscle Use] : no accessory muscle use [Clear to Auscultation] : lungs were clear to auscultation bilaterally [Normal Rate] : normal rate  [Regular Rhythm] : with a regular rhythm [Normal S1, S2] : normal S1 and S2 [No Murmur] : no murmur heard [No Carotid Bruits] : no carotid bruits [No Abdominal Bruit] : a ~M bruit was not heard ~T in the abdomen [No Varicosities] : no varicosities [Pedal Pulses Present] : the pedal pulses are present [No Edema] : there was no peripheral edema [No Palpable Aorta] : no palpable aorta [No Extremity Clubbing/Cyanosis] : no extremity clubbing/cyanosis [Soft] : abdomen soft [Non Tender] : non-tender [Non-distended] : non-distended [No Masses] : no abdominal mass palpated [No HSM] : no HSM [Normal Bowel Sounds] : normal bowel sounds [Normal Posterior Cervical Nodes] : no posterior cervical lymphadenopathy [Normal Anterior Cervical Nodes] : no anterior cervical lymphadenopathy [No CVA Tenderness] : no CVA  tenderness [No Spinal Tenderness] : no spinal tenderness [No Joint Swelling] : no joint swelling [Grossly Normal Strength/Tone] : grossly normal strength/tone [No Rash] : no rash [Coordination Grossly Intact] : coordination grossly intact [No Focal Deficits] : no focal deficits [Normal Gait] : normal gait [Deep Tendon Reflexes (DTR)] : deep tendon reflexes were 2+ and symmetric [Normal Affect] : the affect was normal [Normal Insight/Judgement] : insight and judgment were intact [de-identified] : [place]-Hallpike negative

## 2022-04-06 NOTE — ASSESSMENT
[FreeTextEntry1] : -Blood work done today\par Frequent Urination \par -Check kidney function, urine analysis consider imaging if testing unremarkable.\par \par Dizziness possibly medication withdrawal or anxiety \par -no gross neurologic deficits. \par -If symptom persist, to get brain imaging to assess for any cerebral pathology.

## 2022-04-06 NOTE — HISTORY OF PRESENT ILLNESS
[FreeTextEntry1] : Patient presents to establish care and follow-up. [de-identified] : Patient is doing well overall. Anxiety has been currently stable. \par Patient reports of using Effexor 300 mg for anxiety for 6 yrs. She waned off and stopped 3 weeks ago.\par Pt c/o intermittent episodes of nausea, uneasiness and off-balance after stopping the medication. She notes it is worse during evening and would last for the whole night.\par She notes symptoms of tremors and palpitations with anxiety. \par Denies heartburn, headaches, any medications, vomiting, change in appetite.\par Denies numbness, tingling sensation, blurry vision, double vision.\par Also notes of frequent urination after stopping the medication. Denies pressure in urination, painful urination or nocturnal awakenings.\par She went to ENT for sore throat and was evaluated with tonsil stones. Describes as globus sensation.\par UTD with GYN, dentist\par SocHx: no drug use, social alcohol use.\par FHx: DM and HTN

## 2022-04-06 NOTE — REVIEW OF SYSTEMS
[Negative] : Heme/Lymph [Sore Throat] : sore throat [Nausea] : nausea [Anxiety] : anxiety [de-identified] : Unsteady balance

## 2022-04-10 ENCOUNTER — TRANSCRIPTION ENCOUNTER (OUTPATIENT)
Age: 27
End: 2022-04-10

## 2022-04-11 ENCOUNTER — TRANSCRIPTION ENCOUNTER (OUTPATIENT)
Age: 27
End: 2022-04-11

## 2022-04-11 LAB
25(OH)D3 SERPL-MCNC: 32.4 NG/ML
ALBUMIN SERPL ELPH-MCNC: 5 G/DL
ALP BLD-CCNC: 60 U/L
ALT SERPL-CCNC: 25 U/L
AMYLASE/CREAT SERPL: 51 U/L
ANION GAP SERPL CALC-SCNC: 11 MMOL/L
APPEARANCE: CLEAR
APPEARANCE: CLEAR
AST SERPL-CCNC: 24 U/L
BACTERIA: NEGATIVE
BASOPHILS # BLD AUTO: 0.06 K/UL
BASOPHILS NFR BLD AUTO: 1.5 %
BILIRUB SERPL-MCNC: 0.4 MG/DL
BILIRUBIN URINE: NEGATIVE
BILIRUBIN URINE: NEGATIVE
BLOOD URINE: NEGATIVE
BLOOD URINE: NEGATIVE
BUN SERPL-MCNC: 14 MG/DL
C TRACH RRNA SPEC QL NAA+PROBE: NOT DETECTED
CALCIUM SERPL-MCNC: 10.4 MG/DL
CHLORIDE SERPL-SCNC: 102 MMOL/L
CHOLEST SERPL-MCNC: 186 MG/DL
CO2 SERPL-SCNC: 28 MMOL/L
COLOR: NORMAL
COLOR: NORMAL
CREAT SERPL-MCNC: 0.67 MG/DL
EGFR: 124 ML/MIN/1.73M2
EOSINOPHIL # BLD AUTO: 0.09 K/UL
EOSINOPHIL NFR BLD AUTO: 2.3 %
ESTIMATED AVERAGE GLUCOSE: 103 MG/DL
GLUCOSE QUALITATIVE U: NEGATIVE
GLUCOSE QUALITATIVE U: NEGATIVE
GLUCOSE SERPL-MCNC: 84 MG/DL
HAV IGM SER QL: NONREACTIVE
HBA1C MFR BLD HPLC: 5.2 %
HBV CORE IGM SER QL: NONREACTIVE
HBV SURFACE AG SER QL: NONREACTIVE
HCT VFR BLD CALC: 41.4 %
HCV AB SER QL: NONREACTIVE
HCV S/CO RATIO: 0.2 S/CO
HDLC SERPL-MCNC: 82 MG/DL
HGB BLD-MCNC: 13.2 G/DL
HIV1+2 AB SPEC QL IA.RAPID: NONREACTIVE
HYALINE CASTS: 2 /LPF
IMM GRANULOCYTES NFR BLD AUTO: 0 %
KETONES URINE: NEGATIVE
KETONES URINE: NEGATIVE
LDLC SERPL CALC-MCNC: 92 MG/DL
LEUKOCYTE ESTERASE URINE: NEGATIVE
LEUKOCYTE ESTERASE URINE: NEGATIVE
LPL SERPL-CCNC: 32 U/L
LYMPHOCYTES # BLD AUTO: 1.62 K/UL
LYMPHOCYTES NFR BLD AUTO: 40.9 %
MAN DIFF?: NORMAL
MCHC RBC-ENTMCNC: 28.9 PG
MCHC RBC-ENTMCNC: 31.9 GM/DL
MCV RBC AUTO: 90.6 FL
MICROSCOPIC-UA: NORMAL
MONOCYTES # BLD AUTO: 0.35 K/UL
MONOCYTES NFR BLD AUTO: 8.8 %
N GONORRHOEA RRNA SPEC QL NAA+PROBE: NOT DETECTED
NEUTROPHILS # BLD AUTO: 1.84 K/UL
NEUTROPHILS NFR BLD AUTO: 46.5 %
NITRITE URINE: NEGATIVE
NITRITE URINE: NEGATIVE
NONHDLC SERPL-MCNC: 104 MG/DL
PH URINE: 7
PH URINE: 7
PLATELET # BLD AUTO: 250 K/UL
POTASSIUM SERPL-SCNC: 4.9 MMOL/L
PROT SERPL-MCNC: 7.6 G/DL
PROTEIN URINE: NORMAL
PROTEIN URINE: NORMAL
RBC # BLD: 4.57 M/UL
RBC # FLD: 12.8 %
RED BLOOD CELLS URINE: 2 /HPF
SODIUM SERPL-SCNC: 140 MMOL/L
SOURCE AMPLIFICATION: NORMAL
SPECIFIC GRAVITY URINE: 1.02
SPECIFIC GRAVITY URINE: 1.02
SQUAMOUS EPITHELIAL CELLS: 4 /HPF
T PALLIDUM AB SER QL IA: NEGATIVE
TRIGL SERPL-MCNC: 61 MG/DL
TSH SERPL-ACNC: 1.9 UIU/ML
UROBILINOGEN URINE: NORMAL
UROBILINOGEN URINE: NORMAL
WBC # FLD AUTO: 3.96 K/UL
WHITE BLOOD CELLS URINE: 1 /HPF

## 2022-04-14 ENCOUNTER — APPOINTMENT (OUTPATIENT)
Dept: OBGYN | Facility: CLINIC | Age: 27
End: 2022-04-14

## 2022-05-11 ENCOUNTER — APPOINTMENT (OUTPATIENT)
Dept: OBGYN | Facility: CLINIC | Age: 27
End: 2022-05-11
Payer: MEDICAID

## 2022-05-11 VITALS
HEIGHT: 64 IN | DIASTOLIC BLOOD PRESSURE: 70 MMHG | HEART RATE: 75 BPM | BODY MASS INDEX: 20.49 KG/M2 | WEIGHT: 120 LBS | OXYGEN SATURATION: 100 % | SYSTOLIC BLOOD PRESSURE: 106 MMHG | RESPIRATION RATE: 12 BRPM | TEMPERATURE: 97.2 F

## 2022-05-11 PROCEDURE — 99214 OFFICE O/P EST MOD 30 MIN: CPT

## 2022-05-11 NOTE — PHYSICAL EXAM
[Labia Majora] : normal [Labia Minora] : normal [Normal] : normal [FreeTextEntry4] : pt c/o tenderness w/ pressure on perineum

## 2022-05-11 NOTE — HISTORY OF PRESENT ILLNESS
[FreeTextEntry1] : patient here for pain w/ intercourse.\bennett had a little "cut" after intercourse about 2months ago which is now healed but concerned that she is now starting to associate sex with pain. \bennett was told by prior gyn she may have vaginismus but didn't follow up w/ urogyn.

## 2022-05-13 ENCOUNTER — EMERGENCY (EMERGENCY)
Facility: HOSPITAL | Age: 27
LOS: 1 days | Discharge: HOME | End: 2022-05-13
Attending: EMERGENCY MEDICINE
Payer: MEDICAID

## 2022-05-13 VITALS
HEART RATE: 89 BPM | HEIGHT: 64 IN | TEMPERATURE: 98 F | DIASTOLIC BLOOD PRESSURE: 83 MMHG | RESPIRATION RATE: 20 BRPM | SYSTOLIC BLOOD PRESSURE: 127 MMHG | OXYGEN SATURATION: 99 % | WEIGHT: 119.93 LBS

## 2022-05-13 LAB
APPEARANCE UR: CLEAR — SIGNIFICANT CHANGE UP
BACTERIA # UR AUTO: NEGATIVE — SIGNIFICANT CHANGE UP
BILIRUB UR-MCNC: NEGATIVE — SIGNIFICANT CHANGE UP
COLOR SPEC: YELLOW — SIGNIFICANT CHANGE UP
DIFF PNL FLD: ABNORMAL
EPI CELLS # UR: 6 /HPF — HIGH
GLUCOSE UR QL: NEGATIVE — SIGNIFICANT CHANGE UP
HCG UR QL: NEGATIVE — SIGNIFICANT CHANGE UP
HYALINE CASTS # UR AUTO: 3 /LPF — HIGH (ref 0–2)
KETONES UR-MCNC: NEGATIVE — SIGNIFICANT CHANGE UP
LEUKOCYTE ESTERASE UR-ACNC: NEGATIVE — SIGNIFICANT CHANGE UP
NITRITE UR-MCNC: NEGATIVE — SIGNIFICANT CHANGE UP
PH UR: 6.5 — SIGNIFICANT CHANGE UP (ref 5–8)
PROT UR-MCNC: ABNORMAL
RBC CASTS # UR COMP ASSIST: 1 /HPF — SIGNIFICANT CHANGE UP (ref 0–4)
SP GR SPEC: 1.03 — HIGH (ref 1.01–1.02)
UROBILINOGEN FLD QL: NEGATIVE — SIGNIFICANT CHANGE UP
WBC UR QL: 2 /HPF — SIGNIFICANT CHANGE UP (ref 0–5)

## 2022-05-13 PROCEDURE — 99284 EMERGENCY DEPT VISIT MOD MDM: CPT

## 2022-05-13 PROCEDURE — 81025 URINE PREGNANCY TEST: CPT

## 2022-05-13 PROCEDURE — 81001 URINALYSIS AUTO W/SCOPE: CPT

## 2022-05-13 PROCEDURE — 99283 EMERGENCY DEPT VISIT LOW MDM: CPT

## 2022-05-13 PROCEDURE — 87086 URINE CULTURE/COLONY COUNT: CPT

## 2022-05-13 RX ORDER — IBUPROFEN 200 MG
600 TABLET ORAL ONCE
Refills: 0 | Status: COMPLETED | OUTPATIENT
Start: 2022-05-13 | End: 2022-05-13

## 2022-05-13 RX ADMIN — Medication 600 MILLIGRAM(S): at 17:06

## 2022-05-13 NOTE — ED PROVIDER NOTE - PHYSICAL EXAMINATION
Gen: AAOx3, non-toxic  Head: NCAT  HEENT: EOMI, oral mucosa moist, normal conjunctiva  Lung: CTAB, no respiratory distress, no wheezes/rhonchi/rales B/L, speaking in full sentences  CV: RRR, no murmurs, rubs or gallops  Pelvic:   Abd: soft, NTND, no guarding, no CVA tenderness  MSK: no visible deformities  Neuro: No focal sensory or motor deficits  Skin: Warm, well perfused, no rash  Psych: normal affect.   ~Sara Clarke M.D. Resident Gen: AAOx3, non-toxic  Head: NCAT  HEENT: EOMI, oral mucosa moist, normal conjunctiva  Lung: CTAB, no respiratory distress, no wheezes/rhonchi/rales B/L, speaking in full sentences  CV: RRR, no murmurs, rubs or gallops  Speculum exam: chaperoned by Dr. Javier Roberts. No external lacerations, external genitalia WNL, scant blood in vault no active bleeding no internal lacerations cervix appears closed no green/yellow discharge, bimanual exam deferred   Abd: soft, NTND, no guarding, no CVA tenderness  MSK: no visible deformities  Neuro: No focal sensory or motor deficits  Skin: Warm, well perfused, no rash  Psych: normal affect.   ~Sara Clarke M.D. Resident

## 2022-05-13 NOTE — ED PROVIDER NOTE - OBJECTIVE STATEMENT
Pt is a 27yoF w/hx of hymenectomy 3y ago p/w pelvic pain. Pt saw ob/gyn 2d ago, was found to have cut inside vagina, during manipulation pt began experiencing severe cramping pelvic pain which has been constant since, located in bilateral lower quadrants, not relieved by tylenol. Pt LMP started 5/8, pt states this period was different as she passed clumps/clots which was unusual, otherwise period is consistent w/previous, no sexual activity for ~2 weeks, no Hx of fibroids/ovarian cysts/vaginal discharge/vaginal odor changes, no Hx of STIs. She denies HA, SOB, CP, n/v/d/c, dysuria/freq/urg, hematochezia/melena, numbness/tingling, focal weakness.     Ob/Gyn: Ivan

## 2022-05-13 NOTE — ED PROVIDER NOTE - PROGRESS NOTE DETAILS
Pt stable, requesting discharge. Discussed results of workup, importance of follow-up with GYN, otc meds for pain, and return precautions with patient who verbalized understanding and agreement. Pt had opportunity to ask questions and address concerns, and results of workup were provided in DC paperwork. Patient is medically clear for DC at this time. -Sara Clarke, PGY-1

## 2022-05-13 NOTE — ED PROVIDER NOTE - PATIENT PORTAL LINK FT
You can access the FollowMyHealth Patient Portal offered by [facility] by registering at the following website: [email or web address]. By joining [employer]’s FollowMyHealth portal, you will also be able to view your health information using other applications (apps) compatible with our system.

## 2022-05-13 NOTE — ED ADULT NURSE NOTE - OBJECTIVE STATEMENT
pt had a pelvic exam 2 days ago and is here for pelvic pain.  no vaginal discharge and abd is not tender

## 2022-05-13 NOTE — ED PROVIDER NOTE - NS ED ROS FT
GENERAL: No fever or chills, EYES: no change in vision, HEENT: no trouble swallowing or speaking, CARDIAC: no chest pain, PULMONARY: no cough or SOB, : +++pelvic pain, GI: no abdominal pain, no nausea, no vomiting, no diarrhea or constipation, : No changes in urination, SKIN: no rashes, NEURO: no headache,  MSK: No joint pain     All other ROS negative unless otherwise specified in HPI.     ~Sara Clarke M.D. Resident

## 2022-05-13 NOTE — ED PROVIDER NOTE - NSFOLLOWUPINSTRUCTIONS_ED_ALL_ED_FT
See your OBGYN next week for follow up -- call to discuss.    Stay well hydrated, eat regular healthy meals, get plenty of rest.    See PELVIC PAIN information and return instructions given to you.    Seek immediate medical care for new/worsening symptoms/concerns. See your OBGYN next week for follow up -- call to discuss.    Stay well hydrated, eat regular healthy meals, get plenty of rest.    See PELVIC PAIN information and return instructions given to you.    Seek immediate medical care for new/worsening symptoms/concerns.    You may take 500-1000 mg acetaminophen (tylenol) every 6 hours, as needed for pain.  You may take 600 mg ibuprofen every 8 hours, with food, as needed for pain.  You can take tylenol and ibuprofen at the same time.

## 2022-05-13 NOTE — ED PROVIDER NOTE - ATTENDING CONTRIBUTION TO CARE
------------ATTENDING NOTE------------  pt c/o increased constant mild/moderate constant pelvic/vaginal pain over past 3 days after bimanual exam, pt has uterine spotting and is finishing menstrual period, no fevers, no dysuria/frequency/urgency, pt has presumed diagnosis Vaginismus and had past hymenectomy, awaiting labs/exam -->  - Javier Roberts MD   --------------------------------------------- ------------ATTENDING NOTE------------  pt c/o increased constant mild/moderate constant pelvic/vaginal pain over past 3 days after bimanual exam, pt has uterine spotting and is finishing menstrual period, no fevers, no dysuria/frequency/urgency, pt has presumed diagnosis Vaginismus and had past hymenectomy, awaiting labs/exam --> UA clear, benign pelvic/biman exam, [facility], likely related to menstrual cycle (had slightly heavier bleeding now resolving), in depth dw pt about ddx, tx, batres, continued close outpt fu.  - Javier Roberts MD   ---------------------------------------------

## 2022-05-13 NOTE — ED PROVIDER NOTE - NSICDXPASTMEDICALHX_GEN_ALL_CORE_FT
PAST MEDICAL HISTORY:  Anxiety disorder     Panic disorder     Stress fracture bilateral lower extremities

## 2022-05-15 LAB
CULTURE RESULTS: SIGNIFICANT CHANGE UP
SPECIMEN SOURCE: SIGNIFICANT CHANGE UP

## 2022-05-16 ENCOUNTER — APPOINTMENT (OUTPATIENT)
Dept: UROGYNECOLOGY | Facility: CLINIC | Age: 27
End: 2022-05-16

## 2022-06-02 ENCOUNTER — APPOINTMENT (OUTPATIENT)
Dept: OBGYN | Facility: CLINIC | Age: 27
End: 2022-06-02

## 2022-06-06 ENCOUNTER — APPOINTMENT (OUTPATIENT)
Dept: OBGYN | Facility: CLINIC | Age: 27
End: 2022-06-06

## 2022-06-08 ENCOUNTER — APPOINTMENT (OUTPATIENT)
Dept: DERMATOLOGY | Facility: CLINIC | Age: 27
End: 2022-06-08

## 2022-06-09 ENCOUNTER — APPOINTMENT (OUTPATIENT)
Dept: UROGYNECOLOGY | Facility: CLINIC | Age: 27
End: 2022-06-09
Payer: MEDICAID

## 2022-06-09 VITALS
DIASTOLIC BLOOD PRESSURE: 60 MMHG | BODY MASS INDEX: 20.49 KG/M2 | OXYGEN SATURATION: 98 % | SYSTOLIC BLOOD PRESSURE: 118 MMHG | WEIGHT: 120 LBS | HEIGHT: 64 IN | HEART RATE: 74 BPM | TEMPERATURE: 98.2 F

## 2022-06-09 DIAGNOSIS — N94.2 VAGINISMUS: ICD-10-CM

## 2022-06-09 DIAGNOSIS — M62.838 OTHER MUSCLE SPASM: ICD-10-CM

## 2022-06-09 PROCEDURE — 99204 OFFICE O/P NEW MOD 45 MIN: CPT

## 2022-06-15 ENCOUNTER — APPOINTMENT (OUTPATIENT)
Dept: OBGYN | Facility: CLINIC | Age: 27
End: 2022-06-15

## 2022-06-16 NOTE — HISTORY OF PRESENT ILLNESS
[FreeTextEntry1] : Is a 27-year-old with dyspareunia.  Has been treated recently for yeast infection by Dr. Love.\par 1 physician felt that she may have vaginismus..  Due to the continued pain with intercourse she does associate intercourse with pain.\par \par There is a significant anxiety component in the background and increasing as she continues to have pain associated with sex.  Discussed the role modification of anxiety producing events and how anxiety relates to pelvic floor dysfunction\par \par \par And assessment increased pelvic floor muscle spasm was noted.  She tolerates 1 finger examination and there is banding pain to palpation 5 out of 5 muscle spasm throughout..  The remainder of the examination is normal.\par \par The pelvic exam is significant for vaginal constriction there is marked at muscle spasm bilateral. There is spasm & tightness of the iliococcygeus muscle, the pubococcygeus muscle, the puborectalis, and piriformis muscles bilaterally.  Myofascial pain is elicited easily and is moderate to severe with examination or palpation of each muscle group. The spasm id greater on the left than the right.  There is tight banding on the left which has focal pain. There is spasm response to palpation or 'rolling' of the examining finger in this region and over most of the taught muscles.   These finding represent severe myofascial spasm, pain, and trigger points are noted at each location.   It is my impression that muscle spasm accounts for this patients pain / dyspareunia.  These findings commonly cause or exacerbate pelvic pain, vaginal pain, dyspareunia, vaginismus, voiding function, and pelvic floor dysfunction.\par \par Based on exam and symptoms, this patient has pelvic floor dysfunction with significant levator spasm. We discussed management options including:\par \par - relaxation techniques including stress reduction\par - hot baths or heating pads to the sacrum\par - use of a foam roller or direct lumbosacral  massage\par - avoiding pushing and straining and management of constipation\par - aspects of exercises which may improve or reduce strain on pelvic floor\par \par  We discussed additional interventions including pelvic floor physical therapy, medications for anxiety, Botox injection of the pelvic floor, dilators, vaginal dilation under anesthesia, oral muscle relaxant, and vaginal diazepam. \par \par After detailed counseling we agreed on the following additional interventions:\par \par Vaginal diazepam:   beginning with 5mg vaginal compounded inserts (one half of 10 mg suppository prescribed).  We discussed risks and benefits of vaginal diazepam, she is aware of off-label use of medication, needs to go to a compounding pharmacy, and the possibility of fatigue, change in mental status , and addiction. \par \par We discussedoption for Vaginal dilation under anesthesia with or without Botox injection\par \par Vaginal dilators likely will be needed.\par \par \par Review of previous notes, labs, current prescriptions was performed.\par History , Physical counseling was performed. \par All questions answered in lay language\par Total time for encounter was       min\par A chaperone was present for the entirety of the encounter\par \par \par \par \par \par \par \par \par \par

## 2022-06-30 ENCOUNTER — APPOINTMENT (OUTPATIENT)
Dept: UROGYNECOLOGY | Facility: CLINIC | Age: 27
End: 2022-06-30

## 2022-06-30 DIAGNOSIS — M62.89 OTHER SPECIFIED DISORDERS OF MUSCLE: ICD-10-CM

## 2022-06-30 NOTE — HISTORY OF PRESENT ILLNESS
[Home] : at home, [unfilled] , at the time of the visit. [Medical Office: ([facility])___] : at the medical office located in  [Verbal consent obtained from patient] : the patient, [unfilled] [FreeTextEntry1] : 27 year old seen initially 3 weeks ago for PFD, poss vaginismus, dysparenia was found to tolerate 1 finger on exam and high PF spasm.   \par \par PF relax program began as well as vaginal diazepam.

## 2022-08-22 ENCOUNTER — APPOINTMENT (OUTPATIENT)
Dept: INTERNAL MEDICINE | Facility: CLINIC | Age: 27
End: 2022-08-22

## 2022-09-12 ENCOUNTER — APPOINTMENT (OUTPATIENT)
Dept: OTOLARYNGOLOGY | Facility: CLINIC | Age: 27
End: 2022-09-12

## 2022-09-16 ENCOUNTER — APPOINTMENT (OUTPATIENT)
Dept: OTOLARYNGOLOGY | Facility: CLINIC | Age: 27
End: 2022-09-16

## 2022-10-28 ENCOUNTER — APPOINTMENT (OUTPATIENT)
Dept: GASTROENTEROLOGY | Facility: CLINIC | Age: 27
End: 2022-10-28

## 2022-12-02 ENCOUNTER — APPOINTMENT (OUTPATIENT)
Dept: INTERNAL MEDICINE | Facility: CLINIC | Age: 27
End: 2022-12-02

## 2022-12-29 ENCOUNTER — APPOINTMENT (OUTPATIENT)
Dept: INTERNAL MEDICINE | Facility: CLINIC | Age: 27
End: 2022-12-29
Payer: MEDICAID

## 2022-12-29 PROCEDURE — 99214 OFFICE O/P EST MOD 30 MIN: CPT | Mod: 95

## 2022-12-30 NOTE — HISTORY OF PRESENT ILLNESS
[Home] : at home, [unfilled] , at the time of the visit. [Medical Office: ([facility])___] : at the medical office located in  [Verbal consent obtained from patient] : the patient, [unfilled] [FreeTextEntry8] : Patient reports that she has been having increased anxiety recently, previous psychiatrist no longer takes insurance\par Denies any alcohol use drug use does have panic attacks\par Denies any thoughts of self-harm\par Interested in starting Effexor again denies any side effects upon starting\par Denies any side effects while taking medication

## 2023-01-03 ENCOUNTER — APPOINTMENT (OUTPATIENT)
Dept: OBGYN | Facility: CLINIC | Age: 28
End: 2023-01-03

## 2023-01-13 ENCOUNTER — APPOINTMENT (OUTPATIENT)
Dept: OBGYN | Facility: CLINIC | Age: 28
End: 2023-01-13

## 2023-01-31 ENCOUNTER — APPOINTMENT (OUTPATIENT)
Dept: INTERNAL MEDICINE | Facility: CLINIC | Age: 28
End: 2023-01-31
Payer: MEDICAID

## 2023-01-31 PROCEDURE — 99213 OFFICE O/P EST LOW 20 MIN: CPT | Mod: 95

## 2023-01-31 NOTE — HISTORY OF PRESENT ILLNESS
[FreeTextEntry1] : Patient presents for follow-up [de-identified] : States she feels improvement, does have some anxiety however improved denies any more panic attacks\par Does have some insomnia which has improved\par Sometimes does have a feeling of food getting stuck in the throat has had it in the past did improve\par Denies any food allergies nausea vomiting

## 2023-01-31 NOTE — ASSESSMENT
[FreeTextEntry1] : Discussed different treatment options discussed possibly increasing however patient does feel substantial improvement is able to function with daily activities will keep his current dose can return to the office and discuss in 3 months

## 2023-02-23 ENCOUNTER — NON-APPOINTMENT (OUTPATIENT)
Age: 28
End: 2023-02-23

## 2023-02-23 ENCOUNTER — APPOINTMENT (OUTPATIENT)
Dept: OTOLARYNGOLOGY | Facility: CLINIC | Age: 28
End: 2023-02-23
Payer: MEDICAID

## 2023-02-23 VITALS
BODY MASS INDEX: 20.49 KG/M2 | HEIGHT: 64 IN | HEART RATE: 100 BPM | DIASTOLIC BLOOD PRESSURE: 73 MMHG | WEIGHT: 120 LBS | SYSTOLIC BLOOD PRESSURE: 127 MMHG | TEMPERATURE: 98.3 F

## 2023-02-23 DIAGNOSIS — J35.8 OTHER CHRONIC DISEASES OF TONSILS AND ADENOIDS: ICD-10-CM

## 2023-02-23 DIAGNOSIS — K21.9 GASTRO-ESOPHAGEAL REFLUX DISEASE W/OUT ESOPHAGITIS: ICD-10-CM

## 2023-02-23 PROCEDURE — 31575 DIAGNOSTIC LARYNGOSCOPY: CPT

## 2023-02-23 PROCEDURE — 99204 OFFICE O/P NEW MOD 45 MIN: CPT | Mod: 25

## 2023-02-23 NOTE — HISTORY OF PRESENT ILLNESS
[de-identified] : tonsil stones\par very bothersome\par done rinses, scrapers, food avoidance \par tried syringe irrigation, and water pick - tonsils bleed easly \par feels causes her pain and they can swell\par feels right worse \par they do come out and she gets relief of sx but getting harder to get out \par bad taste after eating due to them \par 2 tonsillitis per year\par no problems sleeping or

## 2023-02-23 NOTE — ASSESSMENT
[FreeTextEntry1] : tonsil stones and throat pain with cryptic tonsils\par \par tried multiple medical Tx but still very very bothersome. seems to be caused by the stones as gets relief when she gets them out, murillokim george some LPR on exam as well\par will proceed to start lifestyle regiment to reduce overproduction of acid and reduce laryngeal reflux including avoiding caffein, alcohol, eating before bed, spicy and fatty foods, and head elevation at night etc. Handout detailing regiment also given\par Risks benefits and alternatives of tonsillectomy discussed at length. Risks including bleeding that may be severe and difficult to control the back of the throat, infection, significant throat pain that can cause dehydration, voice changes, VPI, persistent symptoms, injury to mouth and teeth, change in taste etc. were discussed \par

## 2023-02-23 NOTE — PROCEDURE
[de-identified] : Procedure performed: laryngeal Endoscopy- Diagnostic\par Pre-op/post op indication: dysphonia\par Verbal and/or written consent obtained from patient, Patient was unable to cooperate with mirror\par Scope #: 3, flexible fiber optic telescope used \par Scope was introduced through the nose passed on the floor of the nose to the nasopharynx and then followed down the soft palate to the lower pharynx. The tongue Base, Larynx, Hypopharynx were examined. Base of tongue was symmetric, vallecular was clear, epiglottis was not deformed, subglottis/ pyriform and posterior pharyngeal walls were clear. No erythema, edema, pooling of secretions, masses or lesions. Airway patent, no foreign body visualized. Postcricoid area with moderate erythema and mild edema. + intra-artenoid bar. No pooling of secretions. True vocal cords, vestibular folds, ventricles, pyriform sinuses, and aryepiglottic folds appear normal bilaterally. Vocal cords mobile with good contact b/l.\par .\par \par

## 2023-02-23 NOTE — CONSULT LETTER
[FreeTextEntry1] : Dear Dr. SIRNIVAS CAM \par I had the pleasure of evaluating your patient KAT SANDERS, thank you for allowing us to participate in their care. please see full note detailing our visit below.\par If you have any questions, please do not hesitate to call me and I would be happy to discuss further. \par \par Aleksander Jiménez M.D.\par Attending Physician,  \par Department of Otolaryngology - Head and Neck Surgery\par [facility] \par Office: [phone]\par Fax: [phone]\par \par

## 2023-05-30 ENCOUNTER — APPOINTMENT (OUTPATIENT)
Dept: INTERNAL MEDICINE | Facility: CLINIC | Age: 28
End: 2023-05-30
Payer: MEDICAID

## 2023-05-30 VITALS
BODY MASS INDEX: 18.32 KG/M2 | HEART RATE: 79 BPM | HEIGHT: 64 IN | OXYGEN SATURATION: 100 % | WEIGHT: 107.31 LBS | SYSTOLIC BLOOD PRESSURE: 117 MMHG | DIASTOLIC BLOOD PRESSURE: 75 MMHG | TEMPERATURE: 98.3 F

## 2023-05-30 DIAGNOSIS — J35.1 HYPERTROPHY OF TONSILS: ICD-10-CM

## 2023-05-30 PROCEDURE — 99395 PREV VISIT EST AGE 18-39: CPT | Mod: 25

## 2023-05-30 NOTE — HEALTH RISK ASSESSMENT
[Good] : ~his/her~  mood as  good [No] : No [Never] : Never [FreeTextEntry1] : Health Maintenance [de-identified] : ENT

## 2023-05-30 NOTE — HISTORY OF PRESENT ILLNESS
[FreeTextEntry1] : Patient presents to establish care and annual physical exam. [de-identified] : Patient is requesting refill for Effexor. Denies any side effects on medication\par Saw ENT and advised for tonsillectomy. Procedure is scheduled on Aug 2nd.\par Continues to have ear pain, neck pain. Described as soreness and if attempts to remove stones, would bleed easily.\par States she has lost weight as she has not been eating well secondary to pain and difficulty swallowing.\par She has changed her diet, saline rinse, with no relief.\par Denies any CP, Chest tightness, fever night sweats.\par Denies any abdominal pain, urinary symptom or change in bowel habits.\par Denies any nausea, vomiting, dizziness, or lightheadedness. \par Pt has appointment with GYN.

## 2023-05-30 NOTE — REVIEW OF SYSTEMS
[Earache] : earache [Negative] : Heme/Lymph [FreeTextEntry2] : Weight loss [FreeTextEntry4] : Tonsilolith [FreeTextEntry9] : neck pain

## 2023-05-30 NOTE — ADDENDUM
[FreeTextEntry1] : I, Wolfgang Land, acted as a scribe on behalf of Dr. Jose Daniel Ghosh MD, on 05/30/2023. \par \par All medical entries made by the scribe were at my, Dr. Jose Daniel Ghosh MD, direction and personally dictated by me on 05/30/2023. I have reviewed the chart and agree that the record accurately reflects my personal performance of the history, physical exam, assessment and plan. I have also personally directed, reviewed, and agreed with the chart.

## 2023-05-30 NOTE — ASSESSMENT
[FreeTextEntry1] : Annual Physical Exam\par -Blood work done today\par -Check A1c, lipid panel and Vitamin levels.\par -BP is stable.\par -Check TFTs given weight loss.\par -Effexor refill provided for anxiety.\par -Neck Soft Tissue CT Scan ordered, given increased bleeding soreness\par -Pt is scheduled for Tonsillectomy in Aug.\par -RTO annually or as needed.

## 2023-06-03 ENCOUNTER — TRANSCRIPTION ENCOUNTER (OUTPATIENT)
Age: 28
End: 2023-06-03

## 2023-06-03 LAB
25(OH)D3 SERPL-MCNC: 36.5 NG/ML
ALBUMIN SERPL ELPH-MCNC: 4.9 G/DL
ALP BLD-CCNC: 58 U/L
ALT SERPL-CCNC: 16 U/L
ANION GAP SERPL CALC-SCNC: 13 MMOL/L
APPEARANCE: CLEAR
AST SERPL-CCNC: 19 U/L
BILIRUB SERPL-MCNC: 0.4 MG/DL
BILIRUBIN URINE: NEGATIVE
BLOOD URINE: NEGATIVE
BUN SERPL-MCNC: 13 MG/DL
CALCIUM SERPL-MCNC: 9.6 MG/DL
CHLORIDE SERPL-SCNC: 102 MMOL/L
CHOLEST SERPL-MCNC: 182 MG/DL
CO2 SERPL-SCNC: 26 MMOL/L
COLOR: YELLOW
CREAT SERPL-MCNC: 0.75 MG/DL
EGFR: 112 ML/MIN/1.73M2
ESTIMATED AVERAGE GLUCOSE: 100 MG/DL
GLUCOSE QUALITATIVE U: NEGATIVE MG/DL
GLUCOSE SERPL-MCNC: 108 MG/DL
HBA1C MFR BLD HPLC: 5.1 %
HDLC SERPL-MCNC: 92 MG/DL
KETONES URINE: NEGATIVE MG/DL
LDLC SERPL CALC-MCNC: 76 MG/DL
LEUKOCYTE ESTERASE URINE: NEGATIVE
NITRITE URINE: NEGATIVE
NONHDLC SERPL-MCNC: 90 MG/DL
PH URINE: 7
POTASSIUM SERPL-SCNC: 5 MMOL/L
PROT SERPL-MCNC: 7.4 G/DL
PROTEIN URINE: NEGATIVE MG/DL
SODIUM SERPL-SCNC: 142 MMOL/L
SPECIFIC GRAVITY URINE: 1.01
T4 FREE SERPL-MCNC: 1.1 NG/DL
TRIGL SERPL-MCNC: 68 MG/DL
TSH SERPL-ACNC: 1.53 UIU/ML
UROBILINOGEN URINE: 0.2 MG/DL
VIT B12 SERPL-MCNC: 818 PG/ML

## 2023-06-07 ENCOUNTER — EMERGENCY (EMERGENCY)
Facility: HOSPITAL | Age: 28
LOS: 1 days | Discharge: HOME | End: 2023-06-07
Attending: EMERGENCY MEDICINE
Payer: MEDICAID

## 2023-06-07 VITALS
RESPIRATION RATE: 18 BRPM | TEMPERATURE: 98 F | HEART RATE: 83 BPM | SYSTOLIC BLOOD PRESSURE: 117 MMHG | OXYGEN SATURATION: 100 % | DIASTOLIC BLOOD PRESSURE: 77 MMHG

## 2023-06-07 VITALS
RESPIRATION RATE: 19 BRPM | OXYGEN SATURATION: 100 % | HEIGHT: 64 IN | TEMPERATURE: 98 F | WEIGHT: 110.01 LBS | HEART RATE: 84 BPM | SYSTOLIC BLOOD PRESSURE: 136 MMHG | DIASTOLIC BLOOD PRESSURE: 87 MMHG

## 2023-06-07 LAB
ALBUMIN SERPL ELPH-MCNC: 4.9 G/DL — SIGNIFICANT CHANGE UP (ref 3.3–5)
ALP SERPL-CCNC: 55 U/L — SIGNIFICANT CHANGE UP (ref 40–120)
ALT FLD-CCNC: 18 U/L — SIGNIFICANT CHANGE UP (ref 10–45)
ANION GAP SERPL CALC-SCNC: 10 MMOL/L — SIGNIFICANT CHANGE UP (ref 5–17)
APPEARANCE UR: CLEAR — SIGNIFICANT CHANGE UP
AST SERPL-CCNC: 20 U/L — SIGNIFICANT CHANGE UP (ref 10–40)
BACTERIA # UR AUTO: NEGATIVE — SIGNIFICANT CHANGE UP
BASOPHILS # BLD AUTO: 0.03 K/UL — SIGNIFICANT CHANGE UP (ref 0–0.2)
BASOPHILS NFR BLD AUTO: 0.7 % — SIGNIFICANT CHANGE UP (ref 0–2)
BILIRUB SERPL-MCNC: 0.4 MG/DL — SIGNIFICANT CHANGE UP (ref 0.2–1.2)
BILIRUB UR-MCNC: NEGATIVE — SIGNIFICANT CHANGE UP
BUN SERPL-MCNC: 10 MG/DL — SIGNIFICANT CHANGE UP (ref 7–23)
CALCIUM SERPL-MCNC: 10.1 MG/DL — SIGNIFICANT CHANGE UP (ref 8.4–10.5)
CHLORIDE SERPL-SCNC: 101 MMOL/L — SIGNIFICANT CHANGE UP (ref 96–108)
CO2 SERPL-SCNC: 27 MMOL/L — SIGNIFICANT CHANGE UP (ref 22–31)
COLOR SPEC: COLORLESS — SIGNIFICANT CHANGE UP
CREAT SERPL-MCNC: 0.64 MG/DL — SIGNIFICANT CHANGE UP (ref 0.5–1.3)
DIFF PNL FLD: NEGATIVE — SIGNIFICANT CHANGE UP
EGFR: 124 ML/MIN/1.73M2 — SIGNIFICANT CHANGE UP
EOSINOPHIL # BLD AUTO: 0.1 K/UL — SIGNIFICANT CHANGE UP (ref 0–0.5)
EOSINOPHIL NFR BLD AUTO: 2.2 % — SIGNIFICANT CHANGE UP (ref 0–6)
EPI CELLS # UR: 2 /HPF — SIGNIFICANT CHANGE UP
GLUCOSE SERPL-MCNC: 104 MG/DL — HIGH (ref 70–99)
GLUCOSE UR QL: NEGATIVE — SIGNIFICANT CHANGE UP
HCG SERPL-ACNC: <2 MIU/ML — SIGNIFICANT CHANGE UP
HCT VFR BLD CALC: 38.9 % — SIGNIFICANT CHANGE UP (ref 34.5–45)
HGB BLD-MCNC: 13 G/DL — SIGNIFICANT CHANGE UP (ref 11.5–15.5)
HYALINE CASTS # UR AUTO: 1 /LPF — SIGNIFICANT CHANGE UP (ref 0–2)
IMM GRANULOCYTES NFR BLD AUTO: 0.2 % — SIGNIFICANT CHANGE UP (ref 0–0.9)
KETONES UR-MCNC: NEGATIVE — SIGNIFICANT CHANGE UP
LEUKOCYTE ESTERASE UR-ACNC: NEGATIVE — SIGNIFICANT CHANGE UP
LIDOCAIN IGE QN: 32 U/L — SIGNIFICANT CHANGE UP (ref 7–60)
LYMPHOCYTES # BLD AUTO: 1.37 K/UL — SIGNIFICANT CHANGE UP (ref 1–3.3)
LYMPHOCYTES # BLD AUTO: 30.7 % — SIGNIFICANT CHANGE UP (ref 13–44)
MCHC RBC-ENTMCNC: 29.5 PG — SIGNIFICANT CHANGE UP (ref 27–34)
MCHC RBC-ENTMCNC: 33.4 GM/DL — SIGNIFICANT CHANGE UP (ref 32–36)
MCV RBC AUTO: 88.2 FL — SIGNIFICANT CHANGE UP (ref 80–100)
MONOCYTES # BLD AUTO: 0.32 K/UL — SIGNIFICANT CHANGE UP (ref 0–0.9)
MONOCYTES NFR BLD AUTO: 7.2 % — SIGNIFICANT CHANGE UP (ref 2–14)
NEUTROPHILS # BLD AUTO: 2.63 K/UL — SIGNIFICANT CHANGE UP (ref 1.8–7.4)
NEUTROPHILS NFR BLD AUTO: 59 % — SIGNIFICANT CHANGE UP (ref 43–77)
NITRITE UR-MCNC: NEGATIVE — SIGNIFICANT CHANGE UP
NRBC # BLD: 0 /100 WBCS — SIGNIFICANT CHANGE UP (ref 0–0)
PH UR: 8 — SIGNIFICANT CHANGE UP (ref 5–8)
PLATELET # BLD AUTO: 228 K/UL — SIGNIFICANT CHANGE UP (ref 150–400)
POTASSIUM SERPL-MCNC: 4.5 MMOL/L — SIGNIFICANT CHANGE UP (ref 3.5–5.3)
POTASSIUM SERPL-SCNC: 4.5 MMOL/L — SIGNIFICANT CHANGE UP (ref 3.5–5.3)
PROT SERPL-MCNC: 7.5 G/DL — SIGNIFICANT CHANGE UP (ref 6–8.3)
PROT UR-MCNC: NEGATIVE — SIGNIFICANT CHANGE UP
RBC # BLD: 4.41 M/UL — SIGNIFICANT CHANGE UP (ref 3.8–5.2)
RBC # FLD: 12.3 % — SIGNIFICANT CHANGE UP (ref 10.3–14.5)
RBC CASTS # UR COMP ASSIST: 2 /HPF — SIGNIFICANT CHANGE UP (ref 0–4)
SODIUM SERPL-SCNC: 138 MMOL/L — SIGNIFICANT CHANGE UP (ref 135–145)
SP GR SPEC: 1.01 — SIGNIFICANT CHANGE UP (ref 1.01–1.02)
UROBILINOGEN FLD QL: NEGATIVE — SIGNIFICANT CHANGE UP
WBC # BLD: 4.46 K/UL — SIGNIFICANT CHANGE UP (ref 3.8–10.5)
WBC # FLD AUTO: 4.46 K/UL — SIGNIFICANT CHANGE UP (ref 3.8–10.5)
WBC UR QL: 1 /HPF — SIGNIFICANT CHANGE UP (ref 0–5)

## 2023-06-07 PROCEDURE — 99284 EMERGENCY DEPT VISIT MOD MDM: CPT | Mod: 25

## 2023-06-07 PROCEDURE — 93975 VASCULAR STUDY: CPT

## 2023-06-07 PROCEDURE — 81001 URINALYSIS AUTO W/SCOPE: CPT

## 2023-06-07 PROCEDURE — 83690 ASSAY OF LIPASE: CPT

## 2023-06-07 PROCEDURE — 96374 THER/PROPH/DIAG INJ IV PUSH: CPT | Mod: XU

## 2023-06-07 PROCEDURE — 85025 COMPLETE CBC W/AUTO DIFF WBC: CPT

## 2023-06-07 PROCEDURE — 84702 CHORIONIC GONADOTROPIN TEST: CPT

## 2023-06-07 PROCEDURE — 76705 ECHO EXAM OF ABDOMEN: CPT

## 2023-06-07 PROCEDURE — 99284 EMERGENCY DEPT VISIT MOD MDM: CPT

## 2023-06-07 PROCEDURE — 76856 US EXAM PELVIC COMPLETE: CPT

## 2023-06-07 PROCEDURE — 36415 COLL VENOUS BLD VENIPUNCTURE: CPT

## 2023-06-07 PROCEDURE — 93975 VASCULAR STUDY: CPT | Mod: 26

## 2023-06-07 PROCEDURE — 87086 URINE CULTURE/COLONY COUNT: CPT

## 2023-06-07 PROCEDURE — 74177 CT ABD & PELVIS W/CONTRAST: CPT | Mod: MA

## 2023-06-07 PROCEDURE — 80053 COMPREHEN METABOLIC PANEL: CPT

## 2023-06-07 PROCEDURE — 74177 CT ABD & PELVIS W/CONTRAST: CPT | Mod: 26,MA

## 2023-06-07 PROCEDURE — 76856 US EXAM PELVIC COMPLETE: CPT | Mod: 26,59

## 2023-06-07 RX ORDER — ACETAMINOPHEN 500 MG
1000 TABLET ORAL ONCE
Refills: 0 | Status: COMPLETED | OUTPATIENT
Start: 2023-06-07 | End: 2023-06-07

## 2023-06-07 RX ORDER — KETOROLAC TROMETHAMINE 30 MG/ML
15 SYRINGE (ML) INJECTION ONCE
Refills: 0 | Status: DISCONTINUED | OUTPATIENT
Start: 2023-06-07 | End: 2023-06-07

## 2023-06-07 RX ADMIN — Medication 1000 MILLIGRAM(S): at 09:00

## 2023-06-07 RX ADMIN — Medication 400 MILLIGRAM(S): at 08:47

## 2023-06-07 NOTE — ED ADULT NURSE NOTE - OBJECTIVE STATEMENT
Female 28 years old with no past medical history, alert and orientedx4 came in for abdominal pain. Pt reports left lower abdominal pain radiating to her back, constant sharp, burning 8/10 for 4 days. Reports chills last night. LMP 5/22, Last BM yesterday. Denies chest pain, sob, fever. N/V, or urinary symptoms.

## 2023-06-07 NOTE — ED PROVIDER NOTE - NS ED ATTENDING STATEMENT MOD
I have seen and examined this patient and fully participated in the care of this patient as the teaching attending.  The service was shared with the TONY.  I reviewed and verified the documentation and independently performed the documented:

## 2023-06-07 NOTE — ED PROVIDER NOTE - PROGRESS NOTE DETAILS
Attending Iris Camara: pt with persistent abdominal pain. will obtain ct scan to crow evaluate pt unable to tolerate pelvic exam. extremely anxious and unable to relax to perform bimanual. Dr. Camara aware. spoke to pt about the results of the CT scan and US, pain is improving, advised to take NSAIDs for pain management. strict return precautions advised. follow up with PMD and GYN. Case discussed with Dr. Reeder.

## 2023-06-07 NOTE — ED PROVIDER NOTE - CLINICAL SUMMARY MEDICAL DECISION MAKING FREE TEXT BOX
Attending Iris Camara: 29 yo female presenting with abdominal pain. upon arrival pt comlaining of pain to lowr abdomen and luq. no known h/o diverticulosis. pocus without evidence of hydronehprosis making ureteral stone less likely. concern for possible torsion. no dysuria to suggest UTI. no known h/o inflammatory bowel disease. will obtain tvus, consider CT scan to further evaluate. hcg negative making ectopic less likely

## 2023-06-07 NOTE — ED PROVIDER NOTE - NS ED ROS FT
Constitutional: No fever or chills  Eyes: No visual changes, eye pain or redness  HEENT: No throat pain, ear pain, nasal pain. No nose bleeding.  CV: No chest pain or lower extremity edema  Resp: No SOB no cough  GI: ++abd pain. No nausea or vomiting. No diarrhea. No constipation.   : No dysuria, hematuria.   MSK: ++musculoskeletal pain  Skin: No rash  Neuro: No headache. No numbness or tingling. No weakness.

## 2023-06-07 NOTE — ED PROVIDER NOTE - PATIENT PORTAL LINK FT
You can access the FollowMyHealth Patient Portal offered by [facility] by registering at the following website: [email or web address]. By joining [facility]’s FollowMyHealth portal, you will also be able to view your health information using other applications (apps) compatible with our system.

## 2023-06-07 NOTE — ED PROVIDER NOTE - NSFOLLOWUPINSTRUCTIONS_ED_ALL_ED_FT
1. Follow with your PMD within 48-72 hours.    2. Rest, no heavy lifting.  Warm compressesTake Motrin 600 mg every 8 hours for pain with food, Follow up with GYN for further eval.    3. Any worsening pain, nausea vomiting, or any other concerns return to ER

## 2023-06-07 NOTE — ED ADULT NURSE NOTE - NSFALLUNIVINTERV_ED_ALL_ED
Bed/Stretcher in lowest position, wheels locked, appropriate side rails in place/Call bell, personal items and telephone in reach/Instruct patient to call for assistance before getting out of bed/chair/stretcher/Non-slip footwear applied when patient is off stretcher/[place] to call system/Physically safe environment - no spills, clutter or unnecessary equipment/Purposeful proactive rounding/Room/bathroom lighting operational, light cord in reach

## 2023-06-07 NOTE — ED PROVIDER NOTE - OBJECTIVE STATEMENT
28yof no pmhx no surgical hx here with 4 days of LLQ pain, reports cramping in nature, constant but get worse at times, associated with mild L sided back pain, worse with movement and walking. NO fever or chills, No nausea or vomiting. No change in BM. good appetite but pain worse after eating. No relief with gasX. No vaginal discharge. LMP 5/22; No hx of STD. No hx of ovarian cysts. 28yof no pmhx no surgical hx here with 4 days of LLQ pain, reports cramping in nature, constant but get worse at times, associated with mild L sided back pain, worse with movement and walking. NO fever or chills, No nausea or vomiting. No change in BM. good appetite but pain worse after eating. No relief with gasX. No vaginal discharge. LMP 5/22; No hx of STD. No hx of ovarian cysts.  Attending Iris Camara: 28-year-old female without significant past medical history presenting with left-sided abdominal pain for the last couple of days.  Pain is located in her left upper and left lower quadrant has been constant and getting progressively worse.  No history of similar in the past.  Last menstrual period was in the end of May.  Patient denies any history of any sexually transmitted diseases.  No vaginal discharge.  No back pain.  No history of any ulcerative colitis or inflammatory bowel disease.  No fevers or chills.  Tried taking Tylenol without any significant improvement.  Tried taking Gas-X today without improvement.

## 2023-06-07 NOTE — ED PROVIDER NOTE - PHYSICAL EXAMINATION
Gen: AAO x 3, NAD  Skin: No rashes or lesions  HEENT: NC/AT, PERRLA, EOMI, MMM  Resp: unlabored CTAB  Cardiac: rrr s1s2,   GI: ND, +BS, Soft, +LLQ ttp   Ext: no pedal edema, FROM in all extremities  Neuro: no focal deficits Gen: AAO x 3, NAD  Skin: No rashes or lesions  HEENT: NC/AT, PERRLA, EOMI, MMM  Resp: unlabored CTAB  Cardiac: rrr s1s2,   GI: ND, +BS, Soft, +LLQ ttp   Ext: no pedal edema, FROM in all extremities  Neuro: no focal deficits  Attending Iris Camara: Gen: NAD, heent: atrauamtic, eomi, perrla, mmm, op pink, uvula midline, neck; nttp, no nuchal rigidity, chest: nttp, no crepitus, cv: rrr, no murmurs, lungs: ctab, abd: soft, ttp llq no peritoneal signs, , no guarding, ext: wwp, neg homans, skin: no rash, neuro: awake and alert, following commands, speech clear, sensation and strength intact, no focal deficits

## 2023-06-07 NOTE — ED PROVIDER NOTE - ATTENDING CONTRIBUTION TO CARE
Attending MD Iris Camara:   I personally have seen and examined this patient.  Physician assistant note reviewed and agree on plan of care and except where noted.  See HPI, PE, and MDM for details.

## 2023-06-08 LAB
CULTURE RESULTS: SIGNIFICANT CHANGE UP
SPECIMEN SOURCE: SIGNIFICANT CHANGE UP

## 2023-06-09 ENCOUNTER — APPOINTMENT (OUTPATIENT)
Dept: INTERNAL MEDICINE | Facility: CLINIC | Age: 28
End: 2023-06-09
Payer: MEDICAID

## 2023-06-09 VITALS
SYSTOLIC BLOOD PRESSURE: 110 MMHG | BODY MASS INDEX: 18.27 KG/M2 | WEIGHT: 107 LBS | HEIGHT: 64 IN | DIASTOLIC BLOOD PRESSURE: 68 MMHG | HEART RATE: 81 BPM | OXYGEN SATURATION: 99 % | TEMPERATURE: 98.5 F

## 2023-06-09 PROCEDURE — 36415 COLL VENOUS BLD VENIPUNCTURE: CPT

## 2023-06-09 PROCEDURE — 76705 ECHO EXAM OF ABDOMEN: CPT | Mod: 26

## 2023-06-09 PROCEDURE — 99214 OFFICE O/P EST MOD 30 MIN: CPT | Mod: 25

## 2023-06-09 RX ORDER — NAPROXEN 500 MG/1
500 TABLET ORAL
Qty: 60 | Refills: 0 | Status: DISCONTINUED | COMMUNITY
Start: 2022-06-03 | End: 2023-06-09

## 2023-06-09 NOTE — ASSESSMENT
[FreeTextEntry1] : -Blood work done today.\par -Tizanidine sent to pharmacy. Continue with warm compress.\par -Check H. Pylori. Consider GI consult for further evaluation-given pain aggravated by eating\par -Abdominal MRI ordered-cutaneous nerve entrapment? to assess for soft tissue injury pathology

## 2023-06-09 NOTE — PHYSICAL EXAM
[Normal] : no acute distress, well nourished, well developed and well-appearing [de-identified] : LUQ discomfort [de-identified] : back pain upon palpation

## 2023-06-09 NOTE — HISTORY OF PRESENT ILLNESS
[FreeTextEntry8] : Patient reports was in ER 2 days ago for severe LUQ abdominal pain a/w chills. She had full workup done, CT Scan and was found to have Cyst on ovaries.\par She was prescribed with 1000 mg Tylenol with no relief. \par \par Pain radiating to L back pain. Described as constant sporadic pain, similar to menstrual cramps.\par Took ibuprofen with minimal relief.\par Reports pain worsens after eating and at night, alleviated with warm compress.\par Denies any change in urine color, coughing, wheezing, SOB.\par Denies any change in diet, diarrhea, heartburn

## 2023-06-09 NOTE — ADDENDUM
[FreeTextEntry1] : I, Wolfgang Land, acted as a scribe on behalf of Dr. Jose Daniel Ghosh MD, on 06/09/2023. \par \par All medical entries made by the scribe were at my, Dr. Jose Daniel Ghosh MD, direction and personally dictated by me on 06/09/2023. I have reviewed the chart and agree that the record accurately reflects my personal performance of the history, physical exam, assessment and plan. I have also personally directed, reviewed, and agreed with the chart.

## 2023-06-11 ENCOUNTER — TRANSCRIPTION ENCOUNTER (OUTPATIENT)
Age: 28
End: 2023-06-11

## 2023-06-11 LAB — UREA BREATH TEST QL: NEGATIVE

## 2023-06-12 ENCOUNTER — TRANSCRIPTION ENCOUNTER (OUTPATIENT)
Age: 28
End: 2023-06-12

## 2023-06-12 LAB
CLINICAL BIOCHEMIST REVIEW: NORMAL
PORPHYRINS SERPL-MCNC: <1 MCG/DL
PORPHYRINS, TOTAL PLASMA REVIEWED BY: NORMAL

## 2023-06-13 ENCOUNTER — APPOINTMENT (OUTPATIENT)
Dept: GASTROENTEROLOGY | Facility: CLINIC | Age: 28
End: 2023-06-13
Payer: MEDICAID

## 2023-06-13 VITALS
HEART RATE: 75 BPM | OXYGEN SATURATION: 99 % | WEIGHT: 108 LBS | BODY MASS INDEX: 18.44 KG/M2 | RESPIRATION RATE: 12 BRPM | TEMPERATURE: 97.8 F | HEIGHT: 64 IN | SYSTOLIC BLOOD PRESSURE: 100 MMHG | DIASTOLIC BLOOD PRESSURE: 60 MMHG

## 2023-06-13 DIAGNOSIS — N83.00 "FOLLICULAR CYST OF OVARY, UNSPECIFIED SIDE": ICD-10-CM

## 2023-06-13 DIAGNOSIS — N94.10 UNSPECIFIED DYSPAREUNIA: ICD-10-CM

## 2023-06-13 PROCEDURE — 99203 OFFICE O/P NEW LOW 30 MIN: CPT

## 2023-06-13 RX ORDER — TIZANIDINE 4 MG/1
4 TABLET ORAL
Qty: 30 | Refills: 1 | Status: DISCONTINUED | COMMUNITY
Start: 2023-06-09 | End: 2023-06-13

## 2023-06-13 RX ORDER — VENLAFAXINE 37.5 MG/1
37.5 TABLET, EXTENDED RELEASE ORAL
Qty: 90 | Refills: 3 | Status: DISCONTINUED | COMMUNITY
Start: 2022-12-29 | End: 2023-06-13

## 2023-06-13 RX ORDER — FLUCONAZOLE 150 MG/1
150 TABLET ORAL
Qty: 2 | Refills: 3 | Status: DISCONTINUED | COMMUNITY
Start: 2022-02-02 | End: 2023-06-13

## 2023-06-13 NOTE — HISTORY OF PRESENT ILLNESS
[FreeTextEntry1] : 28-year-old female usual state of good health who had sudden onset of left upper quadrant discomfort and abdominal discomfort for which she sought care in the emergency room at [facility].  She has recently lost 12 lbs since 2/23 that she relates possibly the stress of school.  In the emergency room, her recent cbc, cmp all normal; .PElvic sonogram with left ovarian cyst, fluid noted in cul de sac. U/a normal.\par Seeing GYN at [facility]. she states she has occasional chills.  There is no nausea or vomiting.  She may have had 1 episode of dark stool but was using Pepto-Bismol several days prior to that. [de-identified] : 06/2023 normal [de-identified] : 06/2023 normal except ovarian cyst

## 2023-06-13 NOTE — REVIEW OF SYSTEMS
[Abdominal Pain] : abdominal pain [Negative] : Heme/Lymph [Feeling Poorly] : feeling poorly [Recent Weight Loss (___ Lbs)] : recent [unfilled] ~Ulb weight loss

## 2023-06-13 NOTE — PHYSICAL EXAM
[Alert] : alert [Normal Voice/Communication] : normal voice/communication [No Acute Distress] : no acute distress [Healthy Appearing] : healthy appearing [Sclera] : the sclera and conjunctiva were normal [Hearing Threshold Finger Rub Not Josephine] : hearing was normal [Normal Lips/Gums] : the lips and gums were normal [Oropharynx] : the oropharynx was normal [Normal Appearance] : the appearance of the neck was normal [No Neck Mass] : no neck mass was observed [No Respiratory Distress] : no respiratory distress [No Acc Muscle Use] : no accessory muscle use [Respiration, Rhythm And Depth] : normal respiratory rhythm and effort [Auscultation Breath Sounds / Voice Sounds] : lungs were clear to auscultation bilaterally [Heart Rate And Rhythm] : heart rate was normal and rhythm regular [Normal S1, S2] : normal S1 and S2 [Murmurs] : no murmurs [Bowel Sounds] : normal bowel sounds [Abdomen Tenderness] : non-tender [No Masses] : no abdominal mass palpated [Abdomen Soft] : soft [] : no hepatosplenomegaly [Oriented To Time, Place, And Person] : oriented to person, place, and time [de-identified] : slight left CVAT

## 2023-06-13 NOTE — ASSESSMENT
[FreeTextEntry1] : 28-year-old female usual state of good health who had sudden onset of left upper quadrant discomfort and abdominal discomfort for which she sought care in the emergency room at [facility].  She has recently lost 12 lbs since 2/23 that she relates possibly the stress of school.  In the emergency room, her recent cbc, cmp all normal; .PElvic sonogram with left ovarian cyst, fluid noted in cul de sac. U/a normal.\par Seeing GYN at [facility]. she states she has occasional chills.  There is no nausea or vomiting.  She may have had 1 episode of dark stool but was using Pepto-Bismol several days prior to that.\par \par IMP:\par 1. ruptured ovarian cyst with fluid in culdesac\par 2. Weight loss\par \par PLAN:\par 1. GYN evaluation in two days\par 2. As d/w pt and mom, if pain worsens, she needs to report back to er\par 3.  Patient advised to follow-up with me after the GYN visit.

## 2023-06-14 ENCOUNTER — APPOINTMENT (OUTPATIENT)
Dept: MRI IMAGING | Facility: CLINIC | Age: 28
End: 2023-06-14

## 2023-06-19 ENCOUNTER — TRANSCRIPTION ENCOUNTER (OUTPATIENT)
Age: 28
End: 2023-06-19

## 2023-06-20 ENCOUNTER — TRANSCRIPTION ENCOUNTER (OUTPATIENT)
Age: 28
End: 2023-06-20

## 2023-06-20 ENCOUNTER — RESULT REVIEW (OUTPATIENT)
Age: 28
End: 2023-06-20

## 2023-06-20 ENCOUNTER — INPATIENT (INPATIENT)
Facility: HOSPITAL | Age: 28
LOS: 0 days | Discharge: HOME | DRG: 743 | End: 2023-06-21
Attending: OBSTETRICS & GYNECOLOGY | Admitting: OBSTETRICS & GYNECOLOGY
Payer: MEDICAID

## 2023-06-20 VITALS
SYSTOLIC BLOOD PRESSURE: 118 MMHG | OXYGEN SATURATION: 100 % | WEIGHT: 106.92 LBS | HEART RATE: 86 BPM | DIASTOLIC BLOOD PRESSURE: 78 MMHG | HEIGHT: 64 IN | TEMPERATURE: 98 F | RESPIRATION RATE: 20 BRPM

## 2023-06-20 DIAGNOSIS — N83.209 UNSPECIFIED OVARIAN CYST, UNSPECIFIED SIDE: ICD-10-CM

## 2023-06-20 LAB
ALBUMIN SERPL ELPH-MCNC: 5 G/DL — SIGNIFICANT CHANGE UP (ref 3.3–5)
ALP SERPL-CCNC: 58 U/L — SIGNIFICANT CHANGE UP (ref 40–120)
ALT FLD-CCNC: 15 U/L — SIGNIFICANT CHANGE UP (ref 10–45)
ANION GAP SERPL CALC-SCNC: 17 MMOL/L — SIGNIFICANT CHANGE UP (ref 5–17)
APPEARANCE UR: CLEAR — SIGNIFICANT CHANGE UP
APTT BLD: 26.9 SEC — LOW (ref 27.5–35.5)
AST SERPL-CCNC: 18 U/L — SIGNIFICANT CHANGE UP (ref 10–40)
BACTERIA # UR AUTO: NEGATIVE — SIGNIFICANT CHANGE UP
BASOPHILS # BLD AUTO: 0.02 K/UL — SIGNIFICANT CHANGE UP (ref 0–0.2)
BASOPHILS NFR BLD AUTO: 0.4 % — SIGNIFICANT CHANGE UP (ref 0–2)
BILIRUB SERPL-MCNC: 0.5 MG/DL — SIGNIFICANT CHANGE UP (ref 0.2–1.2)
BILIRUB UR-MCNC: NEGATIVE — SIGNIFICANT CHANGE UP
BUN SERPL-MCNC: 11 MG/DL — SIGNIFICANT CHANGE UP (ref 7–23)
CALCIUM SERPL-MCNC: 9.5 MG/DL — SIGNIFICANT CHANGE UP (ref 8.4–10.5)
CHLORIDE SERPL-SCNC: 102 MMOL/L — SIGNIFICANT CHANGE UP (ref 96–108)
CO2 SERPL-SCNC: 22 MMOL/L — SIGNIFICANT CHANGE UP (ref 22–31)
COLOR SPEC: SIGNIFICANT CHANGE UP
CREAT SERPL-MCNC: 0.67 MG/DL — SIGNIFICANT CHANGE UP (ref 0.5–1.3)
DIFF PNL FLD: NEGATIVE — SIGNIFICANT CHANGE UP
EGFR: 123 ML/MIN/1.73M2 — SIGNIFICANT CHANGE UP
EOSINOPHIL # BLD AUTO: 0.06 K/UL — SIGNIFICANT CHANGE UP (ref 0–0.5)
EOSINOPHIL NFR BLD AUTO: 1.1 % — SIGNIFICANT CHANGE UP (ref 0–6)
EPI CELLS # UR: 5 /HPF — SIGNIFICANT CHANGE UP
GLUCOSE SERPL-MCNC: 89 MG/DL — SIGNIFICANT CHANGE UP (ref 70–99)
GLUCOSE UR QL: NEGATIVE — SIGNIFICANT CHANGE UP
HCG SERPL-ACNC: <2 MIU/ML — SIGNIFICANT CHANGE UP
HCT VFR BLD CALC: 37.4 % — SIGNIFICANT CHANGE UP (ref 34.5–45)
HGB BLD-MCNC: 12.9 G/DL — SIGNIFICANT CHANGE UP (ref 11.5–15.5)
HYALINE CASTS # UR AUTO: 5 /LPF — HIGH (ref 0–2)
IMM GRANULOCYTES NFR BLD AUTO: 0.4 % — SIGNIFICANT CHANGE UP (ref 0–0.9)
INR BLD: 1.09 RATIO — SIGNIFICANT CHANGE UP (ref 0.88–1.16)
KETONES UR-MCNC: NEGATIVE — SIGNIFICANT CHANGE UP
LEUKOCYTE ESTERASE UR-ACNC: NEGATIVE — SIGNIFICANT CHANGE UP
LIDOCAIN IGE QN: 39 U/L — SIGNIFICANT CHANGE UP (ref 7–60)
LYMPHOCYTES # BLD AUTO: 0.98 K/UL — LOW (ref 1–3.3)
LYMPHOCYTES # BLD AUTO: 17.6 % — SIGNIFICANT CHANGE UP (ref 13–44)
MCHC RBC-ENTMCNC: 29.9 PG — SIGNIFICANT CHANGE UP (ref 27–34)
MCHC RBC-ENTMCNC: 34.5 GM/DL — SIGNIFICANT CHANGE UP (ref 32–36)
MCV RBC AUTO: 86.6 FL — SIGNIFICANT CHANGE UP (ref 80–100)
MONOCYTES # BLD AUTO: 0.32 K/UL — SIGNIFICANT CHANGE UP (ref 0–0.9)
MONOCYTES NFR BLD AUTO: 5.7 % — SIGNIFICANT CHANGE UP (ref 2–14)
NEUTROPHILS # BLD AUTO: 4.17 K/UL — SIGNIFICANT CHANGE UP (ref 1.8–7.4)
NEUTROPHILS NFR BLD AUTO: 74.8 % — SIGNIFICANT CHANGE UP (ref 43–77)
NITRITE UR-MCNC: NEGATIVE — SIGNIFICANT CHANGE UP
NRBC # BLD: 0 /100 WBCS — SIGNIFICANT CHANGE UP (ref 0–0)
PH UR: 5.5 — SIGNIFICANT CHANGE UP (ref 5–8)
PLATELET # BLD AUTO: 215 K/UL — SIGNIFICANT CHANGE UP (ref 150–400)
POTASSIUM SERPL-MCNC: 3.9 MMOL/L — SIGNIFICANT CHANGE UP (ref 3.5–5.3)
POTASSIUM SERPL-SCNC: 3.9 MMOL/L — SIGNIFICANT CHANGE UP (ref 3.5–5.3)
PROT SERPL-MCNC: 8.1 G/DL — SIGNIFICANT CHANGE UP (ref 6–8.3)
PROT UR-MCNC: NEGATIVE — SIGNIFICANT CHANGE UP
PROTHROM AB SERPL-ACNC: 12.6 SEC — SIGNIFICANT CHANGE UP (ref 10.5–13.4)
RBC # BLD: 4.32 M/UL — SIGNIFICANT CHANGE UP (ref 3.8–5.2)
RBC # FLD: 12.3 % — SIGNIFICANT CHANGE UP (ref 10.3–14.5)
RBC CASTS # UR COMP ASSIST: 7 /HPF — HIGH (ref 0–4)
SODIUM SERPL-SCNC: 141 MMOL/L — SIGNIFICANT CHANGE UP (ref 135–145)
SP GR SPEC: 1.02 — SIGNIFICANT CHANGE UP (ref 1.01–1.02)
UROBILINOGEN FLD QL: NEGATIVE — SIGNIFICANT CHANGE UP
WBC # BLD: 5.57 K/UL — SIGNIFICANT CHANGE UP (ref 3.8–10.5)
WBC # FLD AUTO: 5.57 K/UL — SIGNIFICANT CHANGE UP (ref 3.8–10.5)
WBC UR QL: 2 /HPF — SIGNIFICANT CHANGE UP (ref 0–5)

## 2023-06-20 PROCEDURE — 99285 EMERGENCY DEPT VISIT HI MDM: CPT

## 2023-06-20 PROCEDURE — 93975 VASCULAR STUDY: CPT | Mod: 26

## 2023-06-20 PROCEDURE — 58662 LAPAROSCOPY EXCISE LESIONS: CPT

## 2023-06-20 PROCEDURE — 88305 TISSUE EXAM BY PATHOLOGIST: CPT | Mod: 26

## 2023-06-20 PROCEDURE — 76830 TRANSVAGINAL US NON-OB: CPT | Mod: 26

## 2023-06-20 DEVICE — VISTASEAL FIBRIN HUMAN 4ML: Type: IMPLANTABLE DEVICE | Site: LEFT | Status: FUNCTIONAL

## 2023-06-20 DEVICE — SURGICEL POWDER 3 GRAMS: Type: IMPLANTABLE DEVICE | Site: LEFT | Status: FUNCTIONAL

## 2023-06-20 RX ORDER — SODIUM CHLORIDE 9 MG/ML
1000 INJECTION INTRAMUSCULAR; INTRAVENOUS; SUBCUTANEOUS ONCE
Refills: 0 | Status: COMPLETED | OUTPATIENT
Start: 2023-06-20 | End: 2023-06-20

## 2023-06-20 RX ORDER — OXYCODONE HYDROCHLORIDE 5 MG/1
1 TABLET ORAL
Qty: 5 | Refills: 0
Start: 2023-06-20

## 2023-06-20 RX ORDER — ACETAMINOPHEN 500 MG
725 TABLET ORAL ONCE
Refills: 0 | Status: COMPLETED | OUTPATIENT
Start: 2023-06-20 | End: 2023-06-20

## 2023-06-20 RX ORDER — KETOROLAC TROMETHAMINE 30 MG/ML
15 SYRINGE (ML) INJECTION ONCE
Refills: 0 | Status: COMPLETED | OUTPATIENT
Start: 2023-06-20 | End: 2023-06-20

## 2023-06-20 RX ADMIN — SODIUM CHLORIDE 1000 MILLILITER(S): 9 INJECTION INTRAMUSCULAR; INTRAVENOUS; SUBCUTANEOUS at 14:01

## 2023-06-20 NOTE — ED ADULT NURSE NOTE - OBJECTIVE STATEMENT
1321 pt 28yf aox4 c/o abd pain x 1mo was seen here dx lft ovarian cysts, able to verbalize concerns, vss in no acute distress pending eval

## 2023-06-20 NOTE — CONSULT NOTE ADULT - SUBJECTIVE AND OBJECTIVE BOX
KAT SANDERS  28y  Female [id number]    HPI:  Pt 29 yo G0 with LMP  presenting to the ED with LLQ pain.  She was seen in the ED on  for similar complaints and was discharged with recommendations to follow up outpatient OBGYN and for OTC pain control. TVUS showed 2cm ovarian follicle with free fluid in the cul de sac.  Pt states that pain waxes and wains for the past two weeks with 6/10 pain at rest and 10/10 pain at its worst. Pt states OTC pain meds do not really alleviate pain.  On Thursday she saw Dr. Post at [facility] with instructions for monitoring.  Today she presents to the ED with worsening pain similar to the pain that brought her to ED on .    ROS: positive for chills and nausea, No vaginal bleeding      Name of GYN Physician: Dr. Love  OBHx: G0  GynHx: Denies fibroids,  endometriosis, STI's, Abnormal pap smears, no past hx of cysts    PMH: Denies   PSH: Hx of hymenectomy in 2017  Meds: Venlafaxine  Allx: Amoxicillin (rash)  Social History:  Denies smoking use, drug use, alcohol use.       Vital Signs Last 24 Hrs  T(C): 36.6 (2023 12:56), Max: 36.6 (2023 12:56)  T(F): 97.9 (2023 12:56), Max: 97.9 (2023 12:56)  HR: 86 (2023 12:56) (86 - 86)  BP: 118/78 (2023 12:56) (118/78 - 118/78)  BP(mean): --  RR: 20 (2023 12:56) (20 - 20)  SpO2: 100% (2023 12:56) (100% - 100%)    Parameters below as of 2023 12:56  Patient On (Oxygen Delivery Method): room air        Physical Exam:   General: sitting comfortably in bed, NAD   Abd: Soft, moderately-tender, non-distended.      : Bimanual exam with no cervical motion tenderness, uterus wnl, adnexa non palpable b/l.   Ext: non-tender b/l, no edema     LABS:                              12.9   5.57  )-----------( 215      ( 2023 14:17 )             37.4         141  |  102  |  11  ----------------------------<  89  3.9   |  22  |  0.67    Ca    9.5      2023 14:17    TPro  8.1  /  Alb  5.0  /  TBili  0.5  /  DBili  x   /  AST  18  /  ALT  15  /  AlkPhos  58      I&O's Detail    PT/INR - ( 2023 14:17 )   PT: 12.6 sec;   INR: 1.09 ratio         PTT - ( 2023 14:17 )  PTT:26.9 sec  Urinalysis Basic - ( 2023 15:36 )    Color: Light Yellow / Appearance: Clear / S.017 / pH: x  Gluc: x / Ketone: Negative  / Bili: Negative / Urobili: Negative   Blood: x / Protein: Negative / Nitrite: Negative   Leuk Esterase: Negative / RBC: 7 /hpf / WBC 2 /HPF   Sq Epi: x / Non Sq Epi: x / Bacteria: Negative        RADIOLOGY & ADDITIONAL STUDIES:  < from: US Doppler Pelvis (23 @ 15:27) >  Right ovary: 2.8 cm x 2.9 cm x 2.2 cm. Within normal limits. Normal   arterial and venous waveforms.  Left ovary: 4.6 cm x 3.3 cm x 4.5 cm. interval enlargement of left   ovarian cyst with mild internal debris and thin septation measuring 3.5 x   3.0 x 3.5 cm, previously 2.7 x 1.7 x 2.7 cm Normal arterial and venous   waveforms.    Fluid: Trace fluid in the cul-de-sac.    Miscellaneous: Internal debris within the urinary bladder.    < end of copied text >     KAT SANDERS  28y  Female [id number]    HPI:  Pt 29 yo G0 with LMP  presenting to the ED with LLQ pain.  She was seen in the ED on  for similar complaints and was discharged with recommendations to follow up outpatient OBGYN and for OTC pain control. TVUS showed 2cm ovarian follicle with free fluid in the cul de sac.  Pt states that pain waxes and wains for the past two weeks with 6/10 pain at rest and 10/10 pain at its worst. Pt states OTC pain meds do not really alleviate pain.  On Thursday she saw Dr. Post at [facility] with instructions for monitoring.  Today she presents to the ED with worsening pain similar to the pain that brought her to ED on .    ROS: positive for chills and nausea, No vaginal bleeding      Name of GYN Physician: Dr. Love  OBHx: G0  GynHx: Denies fibroids,  endometriosis, STI's, Abnormal pap smears, no past hx of cysts    PMH: Denies   PSH: Hx of hymenectomy in 2017  Meds: Venlafaxine  Allx: Amoxicillin (rash)  Social History:  Denies smoking use, drug use, alcohol use.       Vital Signs Last 24 Hrs  T(C): 36.6 (2023 12:56), Max: 36.6 (2023 12:56)  T(F): 97.9 (2023 12:56), Max: 97.9 (2023 12:56)  HR: 86 (2023 12:56) (86 - 86)  BP: 118/78 (2023 12:56) (118/78 - 118/78)  BP(mean): --  RR: 20 (2023 12:56) (20 - 20)  SpO2: 100% (2023 12:56) (100% - 100%)    Parameters below as of 2023 12:56  Patient On (Oxygen Delivery Method): room air        Physical Exam:   General: sitting comfortably in bed, NAD   Abd: Soft, moderately-tender, non-distended.      : Bimanual exam with no cervical motion tenderness, uterus wnl, adnexa non palpable b/l.   Ext: non-tender b/l, no edema     LABS:                              12.9   5.57  )-----------( 215      ( 2023 14:17 )             37.4         141  |  102  |  11  ----------------------------<  89  3.9   |  22  |  0.67    Ca    9.5      2023 14:17    TPro  8.1  /  Alb  5.0  /  TBili  0.5  /  DBili  x   /  AST  18  /  ALT  15  /  AlkPhos  58      I&O's Detail    PT/INR - ( 2023 14:17 )   PT: 12.6 sec;   INR: 1.09 ratio         PTT - ( 2023 14:17 )  PTT:26.9 sec  Urinalysis Basic - ( 2023 15:36 )    Color: Light Yellow / Appearance: Clear / S.017 / pH: x  Gluc: x / Ketone: Negative  / Bili: Negative / Urobili: Negative   Blood: x / Protein: Negative / Nitrite: Negative   Leuk Esterase: Negative / RBC: 7 /hpf / WBC 2 /HPF   Sq Epi: x / Non Sq Epi: x / Bacteria: Negative        RADIOLOGY & ADDITIONAL STUDIES:  < from: US Doppler Pelvis (23 @ 15:27) >  Right ovary: 2.8 cm x 2.9 cm x 2.2 cm. Within normal limits. Normal   arterial and venous waveforms.  Left ovary: 4.6 cm x 3.3 cm x 4.5 cm. interval enlargement of left   ovarian cyst with mild internal debris and thin septation measuring 3.5 x   3.0 x 3.5 cm, previously 2.7 x 1.7 x 2.7 cm Normal arterial and venous   waveforms.    Fluid: Trace fluid in the cul-de-sac.    Miscellaneous: Internal debris within the urinary bladder.    < end of copied text >     KAT SANDERS  28y  Female [id number]    HPI:  Pt 29 yo G0 with LMP  presenting to the ED with LLQ pain.  She was seen in the ED on  for similar complaints and was discharged with recommendations to follow up outpatient OBGYN and for OTC pain control. TVUS showed 2cm ovarian follicle with free fluid in the cul de sac.  Pt states that pain waxes and wains for the past two weeks with 6/10 pain at rest and 10/10 pain at its worst. Pt states OTC pain meds do not really alleviate pain.  On Thursday she saw Dr. Post at [facility] with instructions for monitoring.  Today she presents to the ED with worsening pain similar to the pain that brought her to ED on .    ROS: positive for chills and nausea, No vaginal bleeding      Name of GYN Physician: Dr. Love  OBHx: G0  GynHx: Denies fibroids,  endometriosis, STI's, Abnormal pap smears, no past hx of cysts    PMH: Denies   PSH: Hx of hymenectomy in 2017  Meds: Venlafaxine  Allx: Amoxicillin (rash)  Social History:  Denies smoking use, drug use, alcohol use.       Vital Signs Last 24 Hrs  T(C): 36.6 (2023 12:56), Max: 36.6 (2023 12:56)  T(F): 97.9 (2023 12:56), Max: 97.9 (2023 12:56)  HR: 86 (2023 12:56) (86 - 86)  BP: 118/78 (2023 12:56) (118/78 - 118/78)  BP(mean): --  RR: 20 (2023 12:56) (20 - 20)  SpO2: 100% (2023 12:56) (100% - 100%)    Parameters below as of 2023 12:56  Patient On (Oxygen Delivery Method): room air        Physical Exam:   General: sitting comfortably in bed, NAD   Abd: Soft, moderately-tender, non-distended.      : Bimanual exam with no cervical motion tenderness, uterus wnl, adnexa non palpable b/l.   Ext: non-tender b/l, no edema     LABS:                              12.9   5.57  )-----------( 215      ( 2023 14:17 )             37.4         141  |  102  |  11  ----------------------------<  89  3.9   |  22  |  0.67    Ca    9.5      2023 14:17    TPro  8.1  /  Alb  5.0  /  TBili  0.5  /  DBili  x   /  AST  18  /  ALT  15  /  AlkPhos  58      I&O's Detail    PT/INR - ( 2023 14:17 )   PT: 12.6 sec;   INR: 1.09 ratio         PTT - ( 2023 14:17 )  PTT:26.9 sec  Urinalysis Basic - ( 2023 15:36 )    Color: Light Yellow / Appearance: Clear / S.017 / pH: x  Gluc: x / Ketone: Negative  / Bili: Negative / Urobili: Negative   Blood: x / Protein: Negative / Nitrite: Negative   Leuk Esterase: Negative / RBC: 7 /hpf / WBC 2 /HPF   Sq Epi: x / Non Sq Epi: x / Bacteria: Negative        RADIOLOGY & ADDITIONAL STUDIES:  < from: US Doppler Pelvis (23 @ 15:27) >  Right ovary: 2.8 cm x 2.9 cm x 2.2 cm. Within normal limits. Normal   arterial and venous waveforms.  Left ovary: 4.6 cm x 3.3 cm x 4.5 cm. interval enlargement of left   ovarian cyst with mild internal debris and thin septation measuring 3.5 x   3.0 x 3.5 cm, previously 2.7 x 1.7 x 2.7 cm Normal arterial and venous   waveforms.    Fluid: Trace fluid in the cul-de-sac.    Miscellaneous: Internal debris within the urinary bladder.    < end of copied text >

## 2023-06-20 NOTE — H&P ADULT - NSHPPHYSICALEXAM_GEN_ALL_CORE
Vital Signs Last 24 Hrs  T(C): 36.6 (20 Jun 2023 12:56), Max: 36.6 (20 Jun 2023 12:56)  T(F): 97.9 (20 Jun 2023 12:56), Max: 97.9 (20 Jun 2023 12:56)  HR: 86 (20 Jun 2023 12:56) (86 - 86)  BP: 118/78 (20 Jun 2023 12:56) (118/78 - 118/78)  BP(mean): --  RR: 20 (20 Jun 2023 12:56) (20 - 20)  SpO2: 100% (20 Jun 2023 12:56) (100% - 100%)    Parameters below as of 20 Jun 2023 12:56  Patient On (Oxygen Delivery Method): room air        Physical Exam:   General: sitting comfortably in bed, NAD   Abd: Soft, +LLQ tenderness to palpation with voluntary guarding, no rigidity, no rebound    : Bimanual exam with no cervical motion tenderness, uterus wnl, adnexa non palpable b/l.   Ext: non-tender b/l, no edema

## 2023-06-20 NOTE — PRE-ANESTHESIA EVALUATION ADULT - NSANTHPMHFT_GEN_ALL_CORE
28F G0, LMP 05/22 who was found to have 2cm ovarian follicle with free fluid here for above procedure.

## 2023-06-20 NOTE — ASU DISCHARGE PLAN (ADULT/PEDIATRIC) - NS MD DC FALL RISK RISK
For information on Fall & Injury Prevention, visit: [email or web address] OR  [email or web address] OR  https://www.cdc.gov/steadi/patient.html

## 2023-06-20 NOTE — ASU DISCHARGE PLAN (ADULT/PEDIATRIC) - ASU DC SPECIAL INSTRUCTIONSFT
Please call to schedule a x2 week follow up with the [facility] clinic with Dr. Latosha Cruz    [facility]   [address] [address] [phone]

## 2023-06-20 NOTE — ED PROVIDER NOTE - NSICDXPASTMEDICALHX_GEN_ALL_CORE_FT
PAST MEDICAL HISTORY:  Anxiety disorder     Panic disorder     Stress fracture bilateral lower extremities     PAST MEDICAL HISTORY:  Anxiety disorder     H/O ovarian cystectomy     Panic disorder     Stress fracture bilateral lower extremities

## 2023-06-20 NOTE — H&P ADULT - HISTORY OF PRESENT ILLNESS
KAT SANDERS  28y  Female [id number]    Pt 27 yo G0 with LMP 5/22 presenting to the ED with LLQ pain.  She was seen in the ED on 6/7 for similar complaints and was discharged with recommendations to follow up outpatient OBGYN and for OTC pain control. TVUS showed 2cm ovarian follicle with free fluid in the cul de sac.  Pt states that pain waxes and wains for the past two weeks with 6/10 pain at rest and 10/10 pain at its worst. Pt states OTC pain meds do not really alleviate pain.  On Thursday she saw Dr. Post at [facility] with instructions for monitoring.  Today she presents to the ED with worsening pain similar to the pain that brought her to ED on 6/7.    ROS: positive for chills and nausea, No vaginal bleeding      Name of GYN Physician: Dr. Love  OBHx: G0  GynHx: Denies fibroids,  endometriosis, STI's, Abnormal pap smears, no past hx of cysts    PMH: Denies   PSH: Hx of hymenectomy in 2017  Meds: Venlafaxine  Allx: Amoxicillin (rash)  Social History:  Denies smoking use, drug use, alcohol use.

## 2023-06-20 NOTE — ASU DISCHARGE PLAN (ADULT/PEDIATRIC) - PROVIDER TOKENS
FREE:[LAST:[[facility]],PHONE:[(   )    -],FAX:[(   )    -],ADDRESS:[[facility]   [address] [place] [address] [phone]],FOLLOWUP:[2 weeks]]

## 2023-06-20 NOTE — CONSULT NOTE ADULT - ASSESSMENT
"  Child & Teen Check Up Year 6-10       Child Health History       Growth Percentile:   Wt Readings from Last 3 Encounters:   19 32.7 kg (72 lb) (49 %)*   18 29.5 kg (65 lb) (53 %)*   16 21.5 kg (47 lb 6 oz) (32 %)*     * Growth percentiles are based on [facility] (Girls, 2-20 Years) data.     Ht Readings from Last 2 Encounters:   19 1.405 m (4' 7.32\") (65 %)*   18 1.33 m (4' 4.36\") (50 %)*     * Growth percentiles are based on CDC (Girls, 2-20 Years) data.     45 %ile based on CDC (Girls, 2-20 Years) BMI-for-age based on body measurements available as of 2019.    Visit Vitals: /71   Pulse 65   Temp 98.4  F (36.9  C) (Oral)   Resp 16   Ht 1.405 m (4' 7.32\")   Wt 32.7 kg (72 lb)   SpO2 99%   BMI 16.54 kg/m    BP Percentile: Blood pressure percentiles are 73 % systolic and 84 % diastolic based on the 2017 AAP Clinical Practice Guideline. Blood pressure percentile targets: 90: 112/74, 95: 116/76, 95 + 12 mmH/88.    Informant: Mother    Family speaks English and so an  was not used.  Family History:   Family History   Problem Relation Age of Onset     Diabetes No family hx of      Coronary Artery Disease No family hx of      Breast Cancer No family hx of      Colon Cancer No family hx of      Prostate Cancer No family hx of      Other Cancer No family hx of      Depression No family hx of        Dyslipidemia Screening:  Pediatric hyperlipidemia risk factors discussed today: No increased risk  Lipid screening performed (recommended if any risk factors): No    Social History: Lives with mother, grandparents, aunts, 2 sisters and brother who are younger.       Did the family/guardian worry about wether their food would run out before they got money to buy more? No  Did the family/guardian find that the food they bought didn't last long enough and they didn't have money to get more?  No     Social History     Socioeconomic History     Marital status: Single    " " Spouse name: None     Number of children: None     Years of education: None     Highest education level: None   Occupational History     None   Social Needs     Financial resource strain: None     Food insecurity:     Worry: None     Inability: None     Transportation needs:     Medical: None     Non-medical: None   Tobacco Use     Smoking status: Never Smoker     Smokeless tobacco: Never Used   Substance and Sexual Activity     Alcohol use: None     Drug use: None     Sexual activity: None   Lifestyle     Physical activity:     Days per week: None     Minutes per session: None     Stress: None   Relationships     Social connections:     Talks on phone: None     Gets together: None     Attends Jainism service: None     Active member of club or organization: None     Attends meetings of clubs or organizations: None     Relationship status: None     Intimate partner violence:     Fear of current or ex partner: None     Emotionally abused: None     Physically abused: None     Forced sexual activity: None   Other Topics Concern     None   Social History Narrative     None       Medical History:   History reviewed. No pertinent past medical history.    Family History and past Medical History reviewed and unchanged/updated.    Parental concerns: Yes, concerned about her mood, sadness, grieving after father passed away August 2016. Thoughts about being \"not here.\"  Discussed with patient on her own, no thoughts of hurting herself. Talks to her teachers when stressed out, but states they are pretty busy.    Immunizations:   Hx immunization reactions?  No    Daily Activities:  Minutes of active play a day 60 minutes  Minutes of screen time a day 2 hours    Nutrition:    Describe intake: > 6 fruits and veggies    Environmental Risks:  Lead exposure: No  TB exposure: No  Guns in house:None    Dental:  Has child been to a dentist? Yes and verbally encouraged family to continue to have annual dental check-up " "    Guidance:  Nutrition: One family meal/day without TV , Safety:  Booster seat/seat belt. and Helmets. and Guidance: Discipline    Mental Health:  Parent-Child Interaction: Normal         ROS   GENERAL: no recent fevers and activity level has been normal  SKIN: Negative for rash, birthmarks, acne, pigmentation changes  HEENT: Negative for hearing problems, vision problems, nasal congestion, eye discharge and eye redness  RESP: No cough, wheezing, difficulty breathing  CV: No cyanosis, fatigue with feeding  GI: Normal stools for age, no diarrhea or constipation   : Normal urination, no disharge or painful urination  MS: No swelling, muscle weakness, joint problems  NEURO: Moves all extremeties normally, normal activity for age  ALLERGY/IMMUNE: See allergy in history         Physical Exam:   /71   Pulse 65   Temp 98.4  F (36.9  C) (Oral)   Resp 16   Ht 1.405 m (4' 7.32\")   Wt 32.7 kg (72 lb)   SpO2 99%   BMI 16.54 kg/m        GENERAL: Active, alert, in no acute distress.  SKIN: Clear. No significant rash, abnormal pigmentation or lesions  HEAD: Normocephalic  EYES: Pupils equal, round, reactive, Extraocular muscles intact. Normal conjunctivae.  EARS: Normal canals. Tympanic membranes are normal; gray and translucent.  NOSE: Normal without discharge.  MOUTH/THROAT: Clear. No oral lesions. Teeth without obvious abnormalities.  NECK: Supple, no masses.  No thyromegaly.  LYMPH NODES: No adenopathy  LUNGS: Clear. No rales, rhonchi, wheezing or retractions  HEART: Regular rhythm. Normal S1/S2. No murmurs. Normal pulses.  ABDOMEN: Soft, non-tender, not distended, no masses or hepatosplenomegaly. Bowel sounds normal.   NEUROLOGIC: No focal findings. Cranial nerves grossly intact: DTR's normal. Normal gait, strength and tone  BACK: Spine is straight, no scoliosis.  EXTREMITIES: Full range of motion, no deformities   : Carmelo stage 1, normal no adhesions    Vision Screen: Passed.  Hearing Screen: Passed.      "    Assessment and Plan     BMI at 45 %ile based on CDC (Girls, 2-20 Years) BMI-for-age based on body measurements available as of 4/29/2019.  No weight concerns.      Pediatric Symptom Checklist (PSC-17):    PSC SCORES 4/30/2019   Inattentive / Hyperactive Symptoms Subtotal 3   Externalizing Symptoms Subtotal 0   Internalizing Symptoms Subtotal 10 (At Risk)   PSC - 17 Total Score 13     Score = 15 or above. Plan further evaluation by behavioral health or medical provider.       Immunization schedule reviewed: Yes:  Following immunizations advised:  Catch up immunizations needed?:No  Influenza if in season:Up to date for this immunization  HPV Vaccine (Gardasil) may be given at age 9 recommended at age 11 years Gardasil up to date.  Dental visit recommended: No  Chewable vitamin for Vit D No  Schedule a routine visit in 1 year.    Darien was seen today for well child c&tc and other.    Diagnoses and all orders for this visit:    Encounter for routine child health examination with abnormal findings    Depressed mood.  Today we discussed patient's low mood.  It seems that since the death of her father in August 2016 she has been struggling with sadness and stress.  She has had thoughts that she would be better off dead during most times of stress, but she has never had thoughts of hurting herself.  She states she mostly talks to her teachers when she is having these thoughts but states that they are pretty busy so she cannot do this on a regular basis.  Today we made a plan for her to see our child psychologist for a visit and potentially set up with therapy.  I also talked to them about a school counselor, and patient does say there is a school counselor she can see every Wednesday so the plan is to start doing that.  We will have her follow-up with me in a few months after getting set up with some more behavioral health services.  -     MENTAL HEALTH REFERRAL  -    Diamond Vogel MD     Pt 27 yo G0 with LMP 5/22 presenting to the ED with LLQ pain. Known to have L ovarian cyst from ED visit on 6/7 for similar LLQ pain. Pt states pain waxes and wains for the last two weeks. States pain meds mildly alleviating but pt concerned about duration and frequency of pain symptoms.   Pt 29 yo G0 with LMP 5/22 presenting to the ED with LLQ pain. Known to have L ovarian cyst from ED visit on 6/7 for similar LLQ pain. Pt states pain waxes and wains for the last two weeks. States pain meds mildly alleviating but pt concerned about duration and frequency of pain symptoms.

## 2023-06-20 NOTE — ED PROVIDER NOTE - PROGRESS NOTE DETAILS
bishop PAUL PGY-1:   Patient stable at this time.  Vital signs stable.  Patient discussed with new ultrasound findings, increased size of cyst. OBGYN consulted.

## 2023-06-20 NOTE — ED PROVIDER NOTE - ATTENDING CONTRIBUTION TO CARE
Patient is a 28-year-old female was here several weeks ago for same pain left lower pelvic region found to have an ovarian cyst returning with similar pain has seen outpatient GYN and has an appoint with her own gynecologist next week also saw GI had an MRI waiting for the results of her abdomen abdomen is otherwise soft nontender pain is constant positive urinary frequency no CVA tenderness the pain does radiate to her back symptoms likely related to ovarian cyst rule out torsion

## 2023-06-20 NOTE — H&P ADULT - ATTENDING COMMENTS
GYN Attg:  Pt seen and examined. Pt with pain concerning for poss torsion. Pt consented for LSC left ovarian cystectomy/detorsion. All questions answered.  STACY Bertrand MD

## 2023-06-20 NOTE — ED ADULT TRIAGE NOTE - HEIGHT IN FEET
[de-identified] : 
5

## 2023-06-20 NOTE — H&P ADULT - ASSESSMENT
28y     here w/ c/o   LLQ abdominal pain with TVUS showing a  left  sided  3.5    cm ovarian cyst.  Given patient's persistent pain over the past 2 weeks with intermittent worsening clinical presentation concerning for intermittent ovarian torsion. Will admit to GYN for emergent laparoscopic cystectomy in setting of suspected active ovarian torsion.        Neuro: IV pain medication for relief prn   CV: Hemodynamically stable   Pulm: saturating well on room air   GI: NPO for OR   : -Ovarian Torsion: To OR for laprascopic ovarian cystectomy, possible unilateral oopherectomy, possible unilateral salpingectomy, possible exploratory laparotomy.  All questions/concerns of patient addressed.  Consents signed with patient.   Heme: SCD's in OR for DVT ppx.  Early and aggressive ambulation post-operatively for DVT ppx.   FEN: LR@125.  Replete electrolytes prn   ID: Afebrile  Endo: no active issues    Dispo: Admit to GYN for OR as above    Margy Mansfield PGY3  seen with Dr. Bertrand

## 2023-06-20 NOTE — ED PROVIDER NOTE - PHYSICAL EXAMINATION
Exam as stated below:   CONSTITUTIONAL: In NAD.   SKIN: Warm dry. No rashes noted.  HEAD: NCAT.   EYES: No scleral icterus. Conjunctiva pink.  ENT: Posterior pharynx with no erythema.  NECK: No ttp.    CARD: RRR. No murmurs.  RESP: Clear to ausculation b/l. No Crackles noted. No Wheezing noted.  ABD: ttp in LLQ  MSK: No pedal edema. No calf tenderness.  NEURO: UE/LE grossly intact. Motor UE/LE sensation grossly intact. CN II-XII grossly intact.   PSYCH: Cooperative, appropriate.

## 2023-06-20 NOTE — ED ADULT NURSE NOTE - NSFALLUNIVINTERV_ED_ALL_ED
Bed/Stretcher in lowest position, wheels locked, appropriate side rails in place/Call bell, personal items and telephone in reach/Instruct patient to call for assistance before getting out of bed/chair/stretcher/Non-slip footwear applied when patient is off stretcher/[place] to call system/Physically safe environment - no spills, clutter or unnecessary equipment/Purposeful proactive rounding/Room/bathroom lighting operational, light cord in reach

## 2023-06-20 NOTE — ED PROVIDER NOTE - OBJECTIVE STATEMENT
HPI & ROS: 28-year-old female no medical history no surgical history complaining of month of abdominal pain, worsening in the past few days.  Was seen here 2 weeks ago with a hemorrhagic cyst  noted on her CT in the area of the left adnexa.  This is where her pain is.  Is not radiating.  It is colicky.   10 out of 10 pain.  When it is at its worst, it causes her lightheadedness and causes her not to be able to work. HPI & ROS: 28-year-old female no medical history no surgical history complaining of month of abdominal pain, worsening in the past few days.  Was seen here 2 weeks ago with a hemorrhagic cyst  noted on her CT in the area of the left adnexa.  This is where her pain is.  Is not radiating.  It is colicky.   10 out of 10 pain.  When it is at its worst, it causes her lightheadedness and causes her not to be able to work. No  fever chills.  States that she looks more pale than usual.  No chest pain or shortness of breath.  no vaginal bleeding no discharge.

## 2023-06-20 NOTE — ED PROVIDER NOTE - QTC
Chief complaint:   Chief Complaint   Patient presents with   • Sinus Problem     sinus congestion for the last 2 months       Vitals:  Visit Vitals  /72   Pulse 86   Temp 98.8 °F (37.1 °C) (Oral)   Wt 61.7 kg (136 lb)   LMP 01/07/2022   SpO2 99%   BMI 21.95 kg/m²       HISTORY OF PRESENT ILLNESS     Two months. Located bilateral and diffuse.  Worse in the last two weeks. Started as a head cold. OTC sinus and cold meds are no longer helping for sxs.  Feels like she has a sinus infection. Pre-operative COVID-19 PCR test done today was negative.  Has tolerated Keflex well in the past without flare of her Crohn's Disease.       Other significant problems:  Patient Active Problem List    Diagnosis Date Noted   • Crohn's disease (CMS/HCC) 02/25/2013     Priority: Low       PAST MEDICAL, FAMILY AND SOCIAL HISTORY     Medications:  Current Outpatient Medications   Medication   • Cholecalciferol (Vitamin D3) 50 mcg (2,000 units) capsule   • ascorbic acid (Vitamin C) 250 MG tablet   • NON FORMULARY   • Cyanocobalamin (VITAMIN B-12 PO)   • cefdinir (OMNICEF) 300 MG capsule   • nitrofurantoin, macrocrystal-monohydrate, (Macrobid) 100 MG capsule   • electrolyte/PEG 3350 (MoviPrep) 100 g solution   • azaTHIOprine (IMURAN) 50 MG tablet   • fluticasone (FLONASE) 50 MCG/ACT nasal spray     No current facility-administered medications for this visit.       Allergies:  ALLERGIES:  No Known Allergies    Past Medical  History/Surgeries:  Past Medical History:   Diagnosis Date   • COVID-19 virus detected 07/13/2020   • Crohn's disease (CMS/[facility])    • Failed moderate sedation during procedure     woke up during the last colonoscopy   • PONV (postoperative nausea and vomiting)    • UTI (urinary tract infection)     after intercourse       Past Surgical History:   Procedure Laterality Date   • Abdomen surgery     • Breast enhancement surgery  01/01/2002   • Colon surgery  12/22/2015    Laparoscopic ileocecectomy. Dr. Robert Bass    •  Colonoscopy  02/15/2019    repeat in 3 years - Dr. Merritt   • Colonoscopy w biopsy  01/19/2010 10/23/2015   • Colonoscopy w/ biopsies  11/11/2016    Repeat in 2 years   • Foot surgery  01/01/1998   • Liposuction     • Repair implant  01/01/2006    breast implant rupture       Family History:  Family History   Adopted: Yes   Problem Relation Age of Onset   • Cancer, Breast Mother    • Cancer, Breast Maternal Grandmother    • Cancer, Breast Paternal Aunt    • Cancer Son 25        testicular; reoccurance        Social History:  Social History     Tobacco Use   • Smoking status: Never Smoker   • Smokeless tobacco: Never Used   Substance Use Topics   • Alcohol use: Not Currently     Alcohol/week: 2.0 standard drinks     Types: 2 Glasses of wine per week     Comment: few glasses of wine a month       REVIEW OF SYSTEMS     Review of Systems   Constitutional: Positive for fatigue.   HENT: Positive for ear pain, postnasal drip, rhinorrhea, sinus pressure and sinus pain.    Neurological: Positive for headaches.   All other systems reviewed and are negative.      PHYSICAL EXAM     Physical Exam  Vitals and nursing note reviewed.   Constitutional:       General: She is not in acute distress.     Appearance: She is not ill-appearing, toxic-appearing or diaphoretic.   HENT:      Right Ear: Tympanic membrane, ear canal and external ear normal.      Left Ear: Tympanic membrane, ear canal and external ear normal.      Nose: Congestion and rhinorrhea present. No nasal deformity or septal deviation. Rhinorrhea is purulent.      Right Nostril: No foreign body or epistaxis.      Left Nostril: No foreign body or epistaxis.      Mouth/Throat:      Lips: Pink. No lesions.      Mouth: Mucous membranes are moist. No injury, lacerations, oral lesions or angioedema.      Dentition: No dental abscesses.      Tongue: No lesions. Tongue does not deviate from midline.      Palate: No mass and lesions.      Pharynx: Uvula midline. Oropharyngeal  exudate present. No pharyngeal swelling, posterior oropharyngeal erythema or uvula swelling.      Tonsils: No tonsillar exudate or tonsillar abscesses.   Eyes:      General: No scleral icterus.     Conjunctiva/sclera: Conjunctivae normal.      Comments: No jaundice under fluorescent lighting.   Pulmonary:      Effort: Pulmonary effort is normal.   Skin:     General: Skin is warm and dry.      Capillary Refill: Capillary refill takes less than 2 seconds.      Coloration: Skin is not ashen, cyanotic, jaundiced, mottled, pale or sallow.      Findings: No rash.   Neurological:      Mental Status: She is alert and oriented to person, place, and time.   Psychiatric:         Attention and Perception: Attention normal.         Mood and Affect: Mood and affect normal.         Speech: Speech normal.         Behavior: Behavior is cooperative.         Cognition and Memory: Memory normal.         ASSESSMENT/PLAN     Acute recurrent pansinusitis  (primary encounter diagnosis)  She has a history of Crohn's Disease and has to be careful with what she takes by mouth cannot tolerate probiotics and most antibiotics.  States that she tolerated Keflex in the past.   Orders Placed This Encounter   • cefdinir (OMNICEF) 300 MG capsule     Sig: Take 1 capsule by mouth 2 times daily for 10 days.     Dispense:  20 capsule     Refill:  0   Do not take prescribed medication(s) on an empty stomach.  Have food in stomach before each oral dose of prescribed medication(s).  Consider refrigerated yogurt if not lactose intolerant or if no milk allergy. She declines Educational handout(s) with Patient Instructions provided through today's AVS. WHERE AND WHEN TO FOLLOW-UP: Follow-up with her primary care provider, CALL FIRST, if symptoms are not improving 3 days into treatment, if symptoms are becoming worse or if symptoms do not resolve by the end of treatment.  Questions were answered to the patient's satisfaction.     422

## 2023-06-20 NOTE — ASU DISCHARGE PLAN (ADULT/PEDIATRIC) - CARE PROVIDER_API CALL
[facility],   [facility]   [address] [address] [phone]  Phone: (   )    -  Fax: (   )    -  Follow Up Time: 2 weeks

## 2023-06-21 ENCOUNTER — TRANSCRIPTION ENCOUNTER (OUTPATIENT)
Age: 28
End: 2023-06-21

## 2023-06-21 VITALS
TEMPERATURE: 98 F | RESPIRATION RATE: 18 BRPM | OXYGEN SATURATION: 100 % | HEART RATE: 88 BPM | SYSTOLIC BLOOD PRESSURE: 110 MMHG | DIASTOLIC BLOOD PRESSURE: 56 MMHG

## 2023-06-21 LAB
CULTURE RESULTS: SIGNIFICANT CHANGE UP
SPECIMEN SOURCE: SIGNIFICANT CHANGE UP

## 2023-06-21 PROCEDURE — C1889: CPT

## 2023-06-21 PROCEDURE — 86901 BLOOD TYPING SEROLOGIC RH(D): CPT

## 2023-06-21 PROCEDURE — 80053 COMPREHEN METABOLIC PANEL: CPT

## 2023-06-21 PROCEDURE — 84702 CHORIONIC GONADOTROPIN TEST: CPT

## 2023-06-21 PROCEDURE — 81001 URINALYSIS AUTO W/SCOPE: CPT

## 2023-06-21 PROCEDURE — 86900 BLOOD TYPING SEROLOGIC ABO: CPT

## 2023-06-21 PROCEDURE — 93975 VASCULAR STUDY: CPT

## 2023-06-21 PROCEDURE — 88305 TISSUE EXAM BY PATHOLOGIST: CPT

## 2023-06-21 PROCEDURE — 85025 COMPLETE CBC W/AUTO DIFF WBC: CPT

## 2023-06-21 PROCEDURE — 99285 EMERGENCY DEPT VISIT HI MDM: CPT | Mod: 25

## 2023-06-21 PROCEDURE — 87086 URINE CULTURE/COLONY COUNT: CPT

## 2023-06-21 PROCEDURE — 85610 PROTHROMBIN TIME: CPT

## 2023-06-21 PROCEDURE — 85730 THROMBOPLASTIN TIME PARTIAL: CPT

## 2023-06-21 PROCEDURE — 36000 PLACE NEEDLE IN VEIN: CPT

## 2023-06-21 PROCEDURE — 86850 RBC ANTIBODY SCREEN: CPT

## 2023-06-21 PROCEDURE — C9399: CPT

## 2023-06-21 PROCEDURE — 76830 TRANSVAGINAL US NON-OB: CPT

## 2023-06-21 PROCEDURE — 83690 ASSAY OF LIPASE: CPT

## 2023-06-21 PROCEDURE — 93005 ELECTROCARDIOGRAM TRACING: CPT

## 2023-06-21 RX ORDER — ONDANSETRON 8 MG/1
4 TABLET, FILM COATED ORAL ONCE
Refills: 0 | Status: COMPLETED | OUTPATIENT
Start: 2023-06-21 | End: 2023-06-21

## 2023-06-21 RX ORDER — VENLAFAXINE HCL 75 MG
37.5 CAPSULE, EXT RELEASE 24 HR ORAL ONCE
Refills: 0 | Status: COMPLETED | OUTPATIENT
Start: 2023-06-21 | End: 2023-06-21

## 2023-06-21 RX ORDER — SODIUM CHLORIDE 9 MG/ML
1000 INJECTION INTRAMUSCULAR; INTRAVENOUS; SUBCUTANEOUS
Refills: 0 | Status: DISCONTINUED | OUTPATIENT
Start: 2023-06-21 | End: 2023-06-21

## 2023-06-21 RX ORDER — FAMOTIDINE 10 MG/ML
20 INJECTION INTRAVENOUS ONCE
Refills: 0 | Status: COMPLETED | OUTPATIENT
Start: 2023-06-21 | End: 2023-06-21

## 2023-06-21 RX ORDER — HYDROMORPHONE HYDROCHLORIDE 2 MG/ML
0.5 INJECTION INTRAMUSCULAR; INTRAVENOUS; SUBCUTANEOUS
Refills: 0 | Status: DISCONTINUED | OUTPATIENT
Start: 2023-06-21 | End: 2023-06-21

## 2023-06-21 RX ORDER — SIMETHICONE 80 MG/1
80 TABLET, CHEWABLE ORAL ONCE
Refills: 0 | Status: COMPLETED | OUTPATIENT
Start: 2023-06-21 | End: 2023-06-21

## 2023-06-21 RX ORDER — DIPHENHYDRAMINE HCL 50 MG
25 CAPSULE ORAL ONCE
Refills: 0 | Status: DISCONTINUED | OUTPATIENT
Start: 2023-06-21 | End: 2023-06-21

## 2023-06-21 RX ORDER — FENTANYL CITRATE 50 UG/ML
25 INJECTION INTRAVENOUS
Refills: 0 | Status: DISCONTINUED | OUTPATIENT
Start: 2023-06-21 | End: 2023-06-21

## 2023-06-21 RX ORDER — METOCLOPRAMIDE HCL 10 MG
10 TABLET ORAL ONCE
Refills: 0 | Status: COMPLETED | OUTPATIENT
Start: 2023-06-21 | End: 2023-06-21

## 2023-06-21 RX ORDER — ACETAMINOPHEN 500 MG
725 TABLET ORAL ONCE
Refills: 0 | Status: COMPLETED | OUTPATIENT
Start: 2023-06-21 | End: 2023-06-21

## 2023-06-21 RX ADMIN — ONDANSETRON 4 MILLIGRAM(S): 8 TABLET, FILM COATED ORAL at 01:55

## 2023-06-21 RX ADMIN — Medication 290 MILLIGRAM(S): at 04:43

## 2023-06-21 RX ADMIN — HYDROMORPHONE HYDROCHLORIDE 0.5 MILLIGRAM(S): 2 INJECTION INTRAMUSCULAR; INTRAVENOUS; SUBCUTANEOUS at 01:05

## 2023-06-21 RX ADMIN — SIMETHICONE 80 MILLIGRAM(S): 80 TABLET, CHEWABLE ORAL at 08:10

## 2023-06-21 RX ADMIN — Medication 37.5 MILLIGRAM(S): at 09:09

## 2023-06-21 RX ADMIN — FAMOTIDINE 20 MILLIGRAM(S): 10 INJECTION INTRAVENOUS at 08:37

## 2023-06-21 RX ADMIN — HYDROMORPHONE HYDROCHLORIDE 0.5 MILLIGRAM(S): 2 INJECTION INTRAMUSCULAR; INTRAVENOUS; SUBCUTANEOUS at 00:50

## 2023-06-21 RX ADMIN — ONDANSETRON 4 MILLIGRAM(S): 8 TABLET, FILM COATED ORAL at 09:15

## 2023-06-21 RX ADMIN — Medication 10 MILLIGRAM(S): at 03:23

## 2023-06-21 NOTE — BRIEF OPERATIVE NOTE - OPERATION/FINDINGS
Laparoscopy with no evidence of torsion. Left ovary and tube grossly normal. Right ovary viable. Right tube grossly normal. Uterus grossly normal. Small amount of serous fluid in the posterior cul-de-sac. Right ovary with ~3-4cm cyst filled w/ serous fluid (leakage during dissection). Cyst removed. Hemostasis of ovary obtained w/ Klepinger, Vistaseal, Surgicell, and 0-0 Vicryl Laparoscopy with no evidence of torsion. Right ovary and tube grossly normal. with ~3-4cm cyst filled w/ serous fluid (leakage during dissection). Uterus grossly normal. Left ovary with Small amount of serous fluid in the posterior cul-de-sac. Cyst removed. Hemostasis of ovary obtained w/ Klepinger, Vistaseal, Surgicell, and 0-0 Vicryl

## 2023-06-21 NOTE — PROGRESS NOTE ADULT - SUBJECTIVE AND OBJECTIVE BOX
R2 GYN Progress Note    POD#1   HD#2    Patient seen and examined at bedside. Patient felt nauseous overnight though denies vomiting. No acute complaints.  Pain well controlled.  Patient is tolerating clear liquids. Patient has not yet ambulated to void 2/2 to abdominal pain.   Has not yet passed flatus.   Denies CP, SOB, N/V, fevers, and chills.    Vital Signs Last 24 Hours  T(C): 36.5 (06-21-23 @ 04:45), Max: 36.8 (06-20-23 @ 19:17)  HR: 81 (06-21-23 @ 05:30) (60 - 97)  BP: 96/50 (06-21-23 @ 05:00) (91/51 - 118/78)  RR: 16 (06-21-23 @ 05:00) (16 - 20)  SpO2: 98% (06-21-23 @ 05:30) (97% - 100%)    I&O's Summary    20 Jun 2023 07:01  -  21 Jun 2023 06:47  --------------------------------------------------------  IN: 50 mL / OUT: 50 mL / NET: 0 mL        Physical Exam:  General: NAD  CV: RR, S1, S2, no M/R/G  Lungs: CTA b/l, good air flow b/l   Abdomen: Soft, appropriately-tender, softly distended, tympanic, normoactive bowel sounds  Incision: port sites CDI covered with steri strips and gauze and tegaderm at umbilical incision.   : no bleeding on pad  Ext: No pain or swelling     Labs:                        12.9   5.57  )-----------( 215      ( 20 Jun 2023 14:17 )             37.4   baso 0.4    eos 1.1    imm gran 0.4    lymph 17.6   mono 5.7    poly 74.8     MEDICATIONS  (PRN):  fentaNYL    Injectable 25 MICROGram(s) IV Push every 5 minutes PRN Moderate Pain (4 - 6)  HYDROmorphone  Injectable 0.5 milliGRAM(s) IV Push every 10 minutes PRN Severe Pain (7 - 10)

## 2023-06-21 NOTE — PROGRESS NOTE ADULT - ASSESSMENT
A/P: 27y/o POD#1 s/p LSC left ovarian cystectomy.  Patient is stable and doing well. Pain regimen reinforced with patient.     Neuro: PO pain meds.   CV: Hemodynamically stable  Pulm: Saturating well on room air, encourage oob/amb  GI: Continue regular diet  : UOP adequate, Voiding spontanously   Heme: c/w SCDs for DVT ppx  FEN:SLIV   ID: Afebrile  Endo: No active issues   Dispo: Continue routine post-op care. Patient cleared for discharge. to follow up in clinic in 2 weeks.     Lisa Vázquez, PGY2

## 2023-06-21 NOTE — CHART NOTE - NSCHARTNOTEFT_GEN_A_CORE
0202    R1 GYN POST-OP CHECK NOTE    SUBJECTIVE:    27yo F now POD0 s/p lsc lt ovarian cystectomy. Reporting abdominal pain, had received 0.5mg of Dilaudid at 0050. Not yet OOB, voiding, or taken anything PO. Patient was reportedly nauseous after administration of Dilaudid. Denies any chest pain, shortness of breath, or any other complaints     fentaNYL    Injectable 25 MICROGram(s) IV Push every 5 minutes PRN  HYDROmorphone  Injectable 0.5 milliGRAM(s) IV Push every 10 minutes PRN      OBJECTIVE:    VITAL SIGNS:  Vital Signs Last 24 Hrs  T(C): 36.5 (21 Jun 2023 00:05), Max: 36.8 (20 Jun 2023 19:17)  T(F): 97.7 (21 Jun 2023 00:05), Max: 98.2 (20 Jun 2023 19:17)  HR: 60 (21 Jun 2023 02:00) (60 - 97)  BP: 92/52 (21 Jun 2023 02:00) (91/51 - 118/78)  BP(mean): 70 (21 Jun 2023 02:00) (68 - 77)  RR: 16 (21 Jun 2023 02:00) (16 - 20)  SpO2: 97% (21 Jun 2023 02:00) (97% - 100%)    Parameters below as of 21 Jun 2023 02:00  Patient On (Oxygen Delivery Method): room air      CAPILLARY BLOOD GLUCOSE            PHYSICAL EXAM:  GEN: No acute distress. Lying in stretcher. Awake   CV: Regular rate and rhythm on bedside monitor   LUNGS: Unlabored breathing. No respiratory distress  ABD: Soft. Appropriately tender in the lower abdomen. No rebound or guarding. No pain elicited w/ jostling of stretcher   Incision: Lsc sites x3 CDI. Lateral sites w/ steri-strips and umbilical incision w/ tegaderm   EXT: No calf tenderness bilaterally    LABS:    CBC: 06-20-23 @ 14:17          12.9  5.57 )-------( 215          37.4        BMP: 06-20-23 @ 14:17  141|102|11  ------------------<89  3.9|22|0.67    AST/ALT: 18/15      ASSESSMENT:  27yo F now POD0 s/p lsc lt ovarian cystectomy. Patient stable, albeit with softer BPs 90-110s/60-70s. Nevertheless, patient is progressing post-operatively. Abdominal exam is reassuring      NEURO: Will continue to monitor pain on current regimen   CV: Hemodynamically stable  PULM: Saturating well on RA  GI: Advance diet as tolerated  : Due to void   HEME: SCDs, early ambulation  ID: Afebrile  Dispo: Home when meeting post-operative milestones     Ramses Machuca, PGY-1  Obstetrics and Gynecology    d/w Dr. ISABELL Mansfield, PGY4

## 2023-06-22 PROBLEM — Z98.890 OTHER SPECIFIED POSTPROCEDURAL STATES: Chronic | Status: ACTIVE | Noted: 2023-06-20

## 2023-06-26 ENCOUNTER — TRANSCRIPTION ENCOUNTER (OUTPATIENT)
Age: 28
End: 2023-06-26

## 2023-06-26 ENCOUNTER — NON-APPOINTMENT (OUTPATIENT)
Age: 28
End: 2023-06-26

## 2023-06-27 ENCOUNTER — APPOINTMENT (OUTPATIENT)
Dept: OBGYN | Facility: CLINIC | Age: 28
End: 2023-06-27
Payer: MEDICAID

## 2023-06-27 ENCOUNTER — TRANSCRIPTION ENCOUNTER (OUTPATIENT)
Age: 28
End: 2023-06-27

## 2023-06-27 VITALS
DIASTOLIC BLOOD PRESSURE: 68 MMHG | HEIGHT: 64 IN | RESPIRATION RATE: 16 BRPM | TEMPERATURE: 98.6 F | OXYGEN SATURATION: 100 % | HEART RATE: 110 BPM | BODY MASS INDEX: 18.44 KG/M2 | WEIGHT: 108 LBS | SYSTOLIC BLOOD PRESSURE: 102 MMHG

## 2023-06-27 PROCEDURE — 99024 POSTOP FOLLOW-UP VISIT: CPT

## 2023-06-27 NOTE — HISTORY OF PRESENT ILLNESS
[FreeTextEntry1] : pt here for follow up after ED visit and emergent laparoscopic ovarian cystectomy for pain and ovarian cyst 6/21.\par reports started having pelvic pain 3wks ago, had severe pain and went to ED. was noted to have ovarian cyst. due to significant pain pt was taken for dx laparoscopy. there was left ovarian cyst about 3-4cm in size, no torsion. ovarian cystectomy was performed. \par reports postop doing well. ambulating, voiding, tolerating diet. \par \par pt here with her mother today.

## 2023-06-27 NOTE — PHYSICAL EXAM
[Appropriately responsive] : appropriately responsive [Alert] : alert [No Acute Distress] : no acute distress [Soft] : soft [Non-tender] : non-tender [Non-distended] : non-distended [No HSM] : No HSM [No Lesions] : no lesions [No Mass] : no mass [Oriented x3] : oriented x3 [FreeTextEntry7] : incision= c/d/i

## 2023-06-27 NOTE — PLAN
[FreeTextEntry1] : management of ovarian cysts discussed. through shared decision making we decided combined OCP for prevention of recurrent ovarian cyst. rx sent and instructions were reviewed in detail. pt verbalized understanding. all questions were answered. \par pt to f/up in 3wks for postop check and f/up again.\par pelvic US in 3months\par Bill PAUL

## 2023-07-04 ENCOUNTER — NON-APPOINTMENT (OUTPATIENT)
Age: 28
End: 2023-07-04

## 2023-07-05 ENCOUNTER — TRANSCRIPTION ENCOUNTER (OUTPATIENT)
Age: 28
End: 2023-07-05

## 2023-07-06 ENCOUNTER — OUTPATIENT (OUTPATIENT)
Dept: OUTPATIENT SERVICES | Facility: HOSPITAL | Age: 28
LOS: 1 days | End: 2023-07-06
Payer: MEDICAID

## 2023-07-06 ENCOUNTER — APPOINTMENT (OUTPATIENT)
Dept: OBGYN | Facility: CLINIC | Age: 28
End: 2023-07-06
Payer: MEDICAID

## 2023-07-06 DIAGNOSIS — R10.2 PELVIC AND PERINEAL PAIN: ICD-10-CM

## 2023-07-06 DIAGNOSIS — N76.0 ACUTE VAGINITIS: ICD-10-CM

## 2023-07-06 PROCEDURE — 99212 OFFICE O/P EST SF 10 MIN: CPT | Mod: GC,25

## 2023-07-06 PROCEDURE — 87086 URINE CULTURE/COLONY COUNT: CPT

## 2023-07-06 PROCEDURE — G0463: CPT

## 2023-07-07 ENCOUNTER — LABORATORY RESULT (OUTPATIENT)
Age: 28
End: 2023-07-07

## 2023-07-08 LAB
CULTURE RESULTS: SIGNIFICANT CHANGE UP
SPECIMEN SOURCE: SIGNIFICANT CHANGE UP

## 2023-07-10 NOTE — HISTORY OF PRESENT ILLNESS
[Pain is well-controlled] : pain is well-controlled [Pelvic Pressure] : pelvic pressure [Clean/Dry/Intact] : clean, dry and intact [Healed] : healed [None] : no vaginal bleeding [Pathology reviewed] : pathology reviewed [Vaginal Bleeding] : no vaginal bleeding [Constipation] : no constipation [de-identified] : Ms. Abbasi presents for postop appt POD#16 s/p lsc L ovarian cystectomy for concern for ovarian torsion. Pathology with serous fluid and no torsions noted. She is feeling well currently. Denies fever, chills, SOB, n/v. She does have significant bruising postop and states that this has greatly improved. She does have abdominal soreness but is able to do all activities. She has seen her private GYN and is planning to start OCP to prevent ovarian cysts.  [de-identified] : well appearing, NAD. healing ecchymoses predominantly around R lsc incision site tracking to mons, vulva, and bl inguinal folds. No fluctuance noted. Areas mostly green/white and no hematoma palpable.

## 2023-07-10 NOTE — PLAN
[No Restrictions] : no restrictions [Unlimited ADLs] : to participate in activities of daily living without limitations as pain allows [Limited Work] : to work with limitations [Limited Housework] : to do housework with limitations [Limited Sports___] : to participate in sports with limitations~U: [unfilled] [FreeTextEntry1] : Discussed intraop findings and expected postop recovery. Explained that ecchymoses may have been from abdominal wall bleeding postop that has since discontinued as no concern for active bleed clinically. Explained that ecchmosis is across dependent areas and will take weeks to heal and has improved significantly from pictures shown by patient. No concern for any active bleeding, infection, hematoma.\par \par f/u prn\par can return to regular GYN\par \par D/w Dr. Cruz\par ALEJA Thibodeaux FMIGS-2/PGY-6

## 2023-07-12 ENCOUNTER — APPOINTMENT (OUTPATIENT)
Dept: OBGYN | Facility: CLINIC | Age: 28
End: 2023-07-12
Payer: SELF-PAY

## 2023-07-12 VITALS
SYSTOLIC BLOOD PRESSURE: 110 MMHG | BODY MASS INDEX: 18.27 KG/M2 | DIASTOLIC BLOOD PRESSURE: 70 MMHG | WEIGHT: 107 LBS | HEIGHT: 64 IN

## 2023-07-12 PROCEDURE — 99213 OFFICE O/P EST LOW 20 MIN: CPT

## 2023-07-13 DIAGNOSIS — R10.2 PELVIC AND PERINEAL PAIN: ICD-10-CM

## 2023-07-13 DIAGNOSIS — N83.209 UNSPECIFIED OVARIAN CYST, UNSPECIFIED SIDE: ICD-10-CM

## 2023-07-16 NOTE — PHYSICAL EXAM
[Appropriately responsive] : appropriately responsive [Alert] : alert [No Acute Distress] : no acute distress [Soft] : soft [Non-distended] : non-distended [No HSM] : No HSM [No Lesions] : no lesions [No Mass] : no mass [Oriented x3] : oriented x3 [Clear to Auscultation B/L] : clear to auscultation bilaterally [FreeTextEntry7] : slight bruising of bilateral pelvis, point tenderness of right abdominal muscles,

## 2023-07-16 NOTE — HISTORY OF PRESENT ILLNESS
[FreeTextEntry1] : 29 yo presents as new patient for establishment of care. Pt was seen in [facility] ED and is s/p laparoscopic left ovarian cystectomy 6/20/23 by JS for left ovarian cyst with concern for torsion preoperatively, no complications and discharged POD 1. Pathology was benign, intraoperatively no torsion was found but pedicle could easily be torsed. Pt reports right-sided abdominal muscle pain as well as bruising of the lower abdomen. She reports that the bruising began 1 week postop but notes that the bruising is resolving.\par \par GYN: h/o ov cyst, denies h/o abnml pap/HPV\par Medical: anxiety\par Surgical: lsc left ovarian cystectomy (6/20/23), hymenotomy (2018)\par Family: MAunt- breast CA (dx 61, alive, unknown BRCA status);  PAunt- cervical CA; mother- HTN, HLD; father- DM\par Meds: effexor 37.5mg qd, multi-vitamins\par Allergies: amoxicillin (urticaria), seasonal allergies\par Social: no tobacco use, exposed to secondhand smoke, drinks socially, used recreational drugs\par \par \par Intraoperative Note:\par Diagnostic laparoscopy with no evidence of torsion bilaterally, right tube and ovary were grossly normal.  Left ovary was viable.  Left tube  was grossly normal.  Uterus was grossly normal as well.  There was a small amount of serous fluid noted in the posterior culdesac.  There was approximately 3 to 4 cm cyst noted on the left ovary.  The right ovary itself was grossly normal and the  right tube was also grossly normal.\par \par \par Pathology Report:\par 1.   Left ovarian cyst\par - Corpus luteal cyst\par \par Verified by: Mirna Ponce MD\par (Electronic Signature)\par Reported on: 06/26/23 13:31 EDT, [facility], [address] [facility] [facility], [place] [address]\par Phone: [phone]   Fax: [phone]\par

## 2023-07-16 NOTE — REVIEW OF SYSTEMS
[Negative] : Heme/Lymph [FreeTextEntry9] : lower abdominal bruising, right-sided abdominal muscle pain

## 2023-07-16 NOTE — END OF VISIT
[FreeTextEntry3] : I, eGrber Olvera, acted as a scribe on behalf of Dr. Caio Bertrand on 07/12/2023 .\par \par All medical entries made by the scribe were at my, Dr. Caio Bertrand's, direction and personally dictated by me on 07/12/2023. I have reviewed the chart and agree that the record accurately reflects my personal performance of the history, physical exam, assessment and plan. I have also personally directed, reviewed, and agreed with the chart.

## 2023-07-16 NOTE — PLAN
[FreeTextEntry1] : 27 yo establishment of care s/p laparoscopic left ovarian cystectomy, benign pathology. Stable.\par preop pain resolved completely. postop bruising of abd wall resolving\par \par Postop Care\par -reviewed pathology with pt\par -routine postop care\par -pelvic rest\par -rto 4wks\par \par H/o ov cyst, s/p cystectomy\par -consider OCPs to suppress ov cysts in the future after next visit\par \par Abdominal pain\par -likely musculoskeletal\par -NSAIDs 3-4 days\par -heat packs PRN\par -recommend abdominal binder\par \par Abdominal bruising\par -is postoperative bruising and will resolve\par -continue to monitor\par \par

## 2023-07-16 NOTE — HISTORY OF PRESENT ILLNESS
[FreeTextEntry1] : 29 yo presents as new patient for establishment of care. Pt was seen in [facility] ED and is s/p laparoscopic left ovarian cystectomy 6/20/23 by JS for left ovarian cyst with concern for torsion preoperatively, no complications and discharged POD 1. Pathology was benign, intraoperatively no torsion was found but pedicle could easily be torsed. Pt reports right-sided abdominal muscle pain as well as bruising of the lower abdomen. She reports that the bruising began 1 week postop but notes that the bruising is resolving.\par \par GYN: h/o ov cyst, denies h/o abnml pap/HPV\par Medical: anxiety\par Surgical: lsc left ovarian cystectomy (6/20/23), hymenotomy (2018)\par Family: MAunt- breast CA (dx 61, alive, unknown BRCA status);  PAunt- cervical CA; mother- HTN, HLD; father- DM\par Meds: effexor 37.5mg qd, multi-vitamins\par Allergies: amoxicillin (urticaria), seasonal allergies\par Social: no tobacco use, exposed to secondhand smoke, drinks socially, used recreational drugs\par \par \par Intraoperative Note:\par Diagnostic laparoscopy with no evidence of torsion bilaterally, right tube and ovary were grossly normal.  Left ovary was viable.  Left tube  was grossly normal.  Uterus was grossly normal as well.  There was a small amount of serous fluid noted in the posterior culdesac.  There was approximately 3 to 4 cm cyst noted on the left ovary.  The right ovary itself was grossly normal and the  right tube was also grossly normal.\par \par \par Pathology Report:\par 1.   Left ovarian cyst\par - Corpus luteal cyst\par \par Verified by: Mirna Ponce MD\par (Electronic Signature)\par Reported on: 06/26/23 13:31 EDT, [facility], [address] [facility]. [address] [facility], [place] [address]\par Phone: [phone]   Fax: [phone]\par

## 2023-07-16 NOTE — END OF VISIT
[FreeTextEntry3] : I, Gerber Olvera, acted as a scribe on behalf of Dr. Caio Bertrand on 07/12/2023 .\par \par All medical entries made by the scribe were at my, Dr. Caio Bertrand's, direction and personally dictated by me on 07/12/2023. I have reviewed the chart and agree that the record accurately reflects my personal performance of the history, physical exam, assessment and plan. I have also personally directed, reviewed, and agreed with the chart.

## 2023-07-17 ENCOUNTER — APPOINTMENT (OUTPATIENT)
Dept: INTERNAL MEDICINE | Facility: CLINIC | Age: 28
End: 2023-07-17

## 2023-07-18 ENCOUNTER — APPOINTMENT (OUTPATIENT)
Dept: OBGYN | Facility: CLINIC | Age: 28
End: 2023-07-18

## 2023-07-26 ENCOUNTER — APPOINTMENT (OUTPATIENT)
Dept: OBGYN | Facility: CLINIC | Age: 28
End: 2023-07-26
Payer: MEDICAID

## 2023-07-26 VITALS
BODY MASS INDEX: 17.75 KG/M2 | DIASTOLIC BLOOD PRESSURE: 71 MMHG | WEIGHT: 104 LBS | HEIGHT: 64 IN | SYSTOLIC BLOOD PRESSURE: 109 MMHG

## 2023-07-26 DIAGNOSIS — R10.30 LOWER ABDOMINAL PAIN, UNSPECIFIED: ICD-10-CM

## 2023-07-26 DIAGNOSIS — N83.202 UNSPECIFIED OVARIAN CYST, LEFT SIDE: ICD-10-CM

## 2023-07-26 PROCEDURE — 99024 POSTOP FOLLOW-UP VISIT: CPT

## 2023-08-07 ENCOUNTER — TRANSCRIPTION ENCOUNTER (OUTPATIENT)
Age: 28
End: 2023-08-07

## 2023-08-21 ENCOUNTER — NON-APPOINTMENT (OUTPATIENT)
Age: 28
End: 2023-08-21

## 2023-08-21 ENCOUNTER — APPOINTMENT (OUTPATIENT)
Dept: GASTROENTEROLOGY | Facility: CLINIC | Age: 28
End: 2023-08-21
Payer: MEDICAID

## 2023-08-21 VITALS
RESPIRATION RATE: 16 BRPM | WEIGHT: 107 LBS | SYSTOLIC BLOOD PRESSURE: 110 MMHG | BODY MASS INDEX: 18.27 KG/M2 | TEMPERATURE: 97.8 F | OXYGEN SATURATION: 99 % | HEART RATE: 104 BPM | DIASTOLIC BLOOD PRESSURE: 68 MMHG | HEIGHT: 64 IN

## 2023-08-21 DIAGNOSIS — N84.0 POLYP OF CORPUS UTERI: ICD-10-CM

## 2023-08-21 DIAGNOSIS — J03.90 ACUTE TONSILLITIS, UNSPECIFIED: ICD-10-CM

## 2023-08-21 DIAGNOSIS — F41.9 ANXIETY DISORDER, UNSPECIFIED: ICD-10-CM

## 2023-08-21 PROCEDURE — 99214 OFFICE O/P EST MOD 30 MIN: CPT | Mod: 25

## 2023-08-21 NOTE — ASSESSMENT
[FreeTextEntry1] : 28-year-old female usual state of good health who had sudden onset of left upper quadrant discomfort and abdominal discomfort for which she sought care in the emergency room at [facility].  She has recently lost 12 lbs since 2/23 that she relates possibly the stress of school.  In the emergency room, her recent cbc, cmp all normal; .PElvic sonogram with left ovarian cyst, fluid noted in cul de sac. U/a normal. Seeing GYN at [facility]. she states she has occasional chills.  There is no nausea or vomiting.  She may have had 1 episode of dark stool but was using Pepto-Bismol several days prior to that.  IMP: 1.periumbilical pain 2. s/p ovarian cystectomy PLAN: 1. cbc, cmp, lipase 2. stool for c.diff, GPP, hpylori

## 2023-08-21 NOTE — HISTORY OF PRESENT ILLNESS
[de-identified] : 06/2023 normal except ovarian cyst [FreeTextEntry1] : 06/2023 normal [de-identified] : 06/2023 normal

## 2023-08-21 NOTE — REVIEW OF SYSTEMS
[Feeling Poorly] : feeling poorly [Recent Weight Loss (___ Lbs)] : recent [unfilled] ~Ulb weight loss [Abdominal Pain] : abdominal pain [Negative] : Constitutional

## 2023-08-21 NOTE — PHYSICAL EXAM
[Alert] : alert [Normal Voice/Communication] : normal voice/communication [Healthy Appearing] : healthy appearing [No Acute Distress] : no acute distress [Sclera] : the sclera and conjunctiva were normal [Hearing Threshold Finger Rub Not [place]] : hearing was normal [Normal Lips/Gums] : the lips and gums were normal [Oropharynx] : the oropharynx was normal [Normal Appearance] : the appearance of the neck was normal [No Neck Mass] : no neck mass was observed [No Respiratory Distress] : no respiratory distress [No Acc Muscle Use] : no accessory muscle use [Respiration, Rhythm And Depth] : normal respiratory rhythm and effort [Auscultation Breath Sounds / Voice Sounds] : lungs were clear to auscultation bilaterally [Heart Rate And Rhythm] : heart rate was normal and rhythm regular [Normal S1, S2] : normal S1 and S2 [Murmurs] : no murmurs [Bowel Sounds] : normal bowel sounds [Abdomen Tenderness] : non-tender [No Masses] : no abdominal mass palpated [Abdomen Soft] : soft [] : no hepatosplenomegaly [Oriented To Time, Place, And Person] : oriented to person, place, and time [de-identified] : slight left CVAT

## 2023-08-22 ENCOUNTER — TRANSCRIPTION ENCOUNTER (OUTPATIENT)
Age: 28
End: 2023-08-22

## 2023-08-22 ENCOUNTER — APPOINTMENT (OUTPATIENT)
Dept: GASTROENTEROLOGY | Facility: CLINIC | Age: 28
End: 2023-08-22
Payer: MEDICAID

## 2023-08-22 LAB
ALBUMIN SERPL ELPH-MCNC: 5 G/DL
ALP BLD-CCNC: 69 U/L
ALT SERPL-CCNC: 15 U/L
AMYLASE/CREAT SERPL: 53 U/L
ANION GAP SERPL CALC-SCNC: 11 MMOL/L
AST SERPL-CCNC: 17 U/L
BILIRUB SERPL-MCNC: 0.4 MG/DL
BUN SERPL-MCNC: 14 MG/DL
CALCIUM SERPL-MCNC: 10 MG/DL
CHLORIDE SERPL-SCNC: 103 MMOL/L
CO2 SERPL-SCNC: 29 MMOL/L
CREAT SERPL-MCNC: 0.7 MG/DL
EGFR: 121 ML/MIN/1.73M2
GLUCOSE SERPL-MCNC: 93 MG/DL
LPL SERPL-CCNC: 36 U/L
POTASSIUM SERPL-SCNC: 4.8 MMOL/L
PROT SERPL-MCNC: 8 G/DL
SODIUM SERPL-SCNC: 143 MMOL/L

## 2023-08-22 PROCEDURE — 83014 H PYLORI DRUG ADMIN: CPT

## 2023-08-23 ENCOUNTER — TRANSCRIPTION ENCOUNTER (OUTPATIENT)
Age: 28
End: 2023-08-23

## 2023-08-23 PROBLEM — N83.202 CYST OF LEFT OVARY: Status: ACTIVE | Noted: 2023-06-27

## 2023-08-23 PROBLEM — R10.30 LOWER ABDOMINAL PAIN: Status: ACTIVE | Noted: 2023-08-21

## 2023-08-23 LAB — UREA BREATH TEST QL: NEGATIVE

## 2023-08-23 NOTE — END OF VISIT
[FreeTextEntry3] : I, Gerber Olvera, acted as a scribe on behalf of Dr. Caio Bertrand on 07/26/2023 .\par \par All medical entries made by the scribe were at my, Dr. Caio Bertrand's, direction and personally dictated by me on 07/26/2023. I have reviewed the chart and agree that the record accurately reflects my personal performance of the history, physical exam, assessment and plan. I have also personally directed, reviewed, and agreed with the chart.

## 2023-08-23 NOTE — ASSESSMENT
[Doing Well] : is doing well [Excellent Pain Control] : has excellent pain control [No Sign of Infection] : is showing no signs of infection [de-identified] : 29 yo s/p emergent laparoscopic left ovarian cystectomy 6/20/23.

## 2023-08-23 NOTE — PLAN
[FreeTextEntry1] : 27 yo s/p laparoscopic left ovarian cystectomy, benign pathology. Resolved abdominal bruising, right abdominal pain.\par \par Postop Care\par -routine postop care\par -pelvic rest\par -pt start OTC Lo to decrease risk of cyst formation\par -rx repeat pelvic US in 3 months to assess ovaries\par \par Abdominal pain\par -referral to PT for abdominal pain\par \par rto 3 months for annual\par

## 2023-08-23 NOTE — HISTORY OF PRESENT ILLNESS
[Pain is well-controlled] : pain is well-controlled [Clean/Dry/Intact] : clean, dry and intact [Healed] : healed [Pathology reviewed] : pathology reviewed [Fever] : no fever [Chills] : no chills [Nausea] : no nausea [Erythema] : not erythematous [Swelling] : not swollen [Dehiscence] : not dehisced [de-identified] : 6/20/23 [de-identified] : emergent laparoscopic left ovarian cystectomy [de-identified] : left ovarian cyst, suspected ovarian torsion [de-identified] : 27 yo s/p emergent laparoscopic left ovarian cystectomy 6/20/23 by JS for left ovarian cyst with concern for torsion preoperatively, no complications and discharged POD 1. Pathology was benign, intraoperatively no torsion was found but pedicle could easily be torsed. Pt reports that the abdominal bruising had resolved, but still reports pain in her right abdomen.\par \par Intraoperative Note:\par Diagnostic laparoscopy with no evidence of torsion bilaterally, right tube and ovary were grossly normal. Left ovary was viable. Left tube was grossly normal. Uterus was grossly normal as well. There was a small amount of serous fluid noted in the posterior cul_de_sac. There was approximately 3 to 4 cm cyst noted on the left ovary. The right ovary itself was grossly normal and the right tube was also grossly normal.\par \par \par Pathology Report:\par 1. Left ovarian cyst\par - Corpus luteal cyst\par \par Verified by: Mirna Ponce MD\par (Electronic Signature)\par Reported on: 06/26/23 13:31 EDT, [facility], [address] [facility] [address] [place] [address]\par Phone: [phone] Fax: [phone]\par  [de-identified] : right abdominal pain [de-identified] : abd soft, nt, nd

## 2023-08-24 ENCOUNTER — TRANSCRIPTION ENCOUNTER (OUTPATIENT)
Age: 28
End: 2023-08-24

## 2023-08-25 ENCOUNTER — NON-APPOINTMENT (OUTPATIENT)
Age: 28
End: 2023-08-25

## 2023-08-25 ENCOUNTER — TRANSCRIPTION ENCOUNTER (OUTPATIENT)
Age: 28
End: 2023-08-25

## 2023-08-26 ENCOUNTER — TRANSCRIPTION ENCOUNTER (OUTPATIENT)
Age: 28
End: 2023-08-26

## 2023-09-06 ENCOUNTER — APPOINTMENT (OUTPATIENT)
Dept: INTERNAL MEDICINE | Facility: CLINIC | Age: 28
End: 2023-09-06

## 2023-09-07 ENCOUNTER — TRANSCRIPTION ENCOUNTER (OUTPATIENT)
Age: 28
End: 2023-09-07

## 2023-09-08 ENCOUNTER — NON-APPOINTMENT (OUTPATIENT)
Age: 28
End: 2023-09-08

## 2023-09-08 ENCOUNTER — TRANSCRIPTION ENCOUNTER (OUTPATIENT)
Age: 28
End: 2023-09-08

## 2023-09-11 ENCOUNTER — TRANSCRIPTION ENCOUNTER (OUTPATIENT)
Age: 28
End: 2023-09-11

## 2023-09-14 ENCOUNTER — TRANSCRIPTION ENCOUNTER (OUTPATIENT)
Age: 28
End: 2023-09-14

## 2023-09-15 ENCOUNTER — NON-APPOINTMENT (OUTPATIENT)
Age: 28
End: 2023-09-15

## 2023-09-15 ENCOUNTER — TRANSCRIPTION ENCOUNTER (OUTPATIENT)
Age: 28
End: 2023-09-15

## 2023-09-21 ENCOUNTER — TRANSCRIPTION ENCOUNTER (OUTPATIENT)
Age: 28
End: 2023-09-21

## 2023-10-04 ENCOUNTER — APPOINTMENT (OUTPATIENT)
Dept: OBGYN | Facility: CLINIC | Age: 28
End: 2023-10-04

## 2023-10-07 ENCOUNTER — TRANSCRIPTION ENCOUNTER (OUTPATIENT)
Age: 28
End: 2023-10-07

## 2023-10-10 ENCOUNTER — APPOINTMENT (OUTPATIENT)
Dept: OBGYN | Facility: CLINIC | Age: 28
End: 2023-10-10

## 2023-10-18 ENCOUNTER — APPOINTMENT (OUTPATIENT)
Dept: OBGYN | Facility: CLINIC | Age: 28
End: 2023-10-18

## 2023-10-19 ENCOUNTER — APPOINTMENT (OUTPATIENT)
Dept: GASTROENTEROLOGY | Facility: AMBULATORY SURGERY CENTER | Age: 28
End: 2023-10-19
Payer: MEDICAID

## 2023-10-19 ENCOUNTER — RESULT REVIEW (OUTPATIENT)
Age: 28
End: 2023-10-19

## 2023-10-19 PROCEDURE — 45380 COLONOSCOPY AND BIOPSY: CPT

## 2023-10-19 PROCEDURE — 43239 EGD BIOPSY SINGLE/MULTIPLE: CPT

## 2023-10-20 ENCOUNTER — TRANSCRIPTION ENCOUNTER (OUTPATIENT)
Age: 28
End: 2023-10-20

## 2023-10-23 ENCOUNTER — TRANSCRIPTION ENCOUNTER (OUTPATIENT)
Age: 28
End: 2023-10-23

## 2023-10-23 RX ORDER — SODIUM SULFATE, POTASSIUM SULFATE AND MAGNESIUM SULFATE 1.6; 3.13; 17.5 G/177ML; G/177ML; G/177ML
17.5-3.13-1.6 SOLUTION ORAL
Qty: 1 | Refills: 0 | Status: DISCONTINUED | COMMUNITY
Start: 2023-09-27 | End: 2023-10-23

## 2023-10-24 ENCOUNTER — TRANSCRIPTION ENCOUNTER (OUTPATIENT)
Age: 28
End: 2023-10-24

## 2023-10-27 ENCOUNTER — TRANSCRIPTION ENCOUNTER (OUTPATIENT)
Age: 28
End: 2023-10-27

## 2023-11-13 ENCOUNTER — TRANSCRIPTION ENCOUNTER (OUTPATIENT)
Age: 28
End: 2023-11-13

## 2023-11-20 ENCOUNTER — TRANSCRIPTION ENCOUNTER (OUTPATIENT)
Age: 28
End: 2023-11-20

## 2023-11-20 RX ORDER — VENLAFAXINE HYDROCHLORIDE 75 MG/1
75 CAPSULE, EXTENDED RELEASE ORAL DAILY
Qty: 90 | Refills: 3 | Status: ACTIVE | COMMUNITY
Start: 2022-12-29 | End: 1900-01-01

## 2023-12-18 ENCOUNTER — APPOINTMENT (OUTPATIENT)
Dept: GASTROENTEROLOGY | Facility: CLINIC | Age: 28
End: 2023-12-18
Payer: MEDICAID

## 2023-12-18 VITALS
HEIGHT: 64 IN | SYSTOLIC BLOOD PRESSURE: 112 MMHG | TEMPERATURE: 98.3 F | RESPIRATION RATE: 16 BRPM | OXYGEN SATURATION: 99 % | DIASTOLIC BLOOD PRESSURE: 62 MMHG | HEART RATE: 83 BPM | WEIGHT: 111 LBS | BODY MASS INDEX: 18.95 KG/M2

## 2023-12-18 DIAGNOSIS — K58.9 IRRITABLE BOWEL SYNDROME W/OUT DIARRHEA: ICD-10-CM

## 2023-12-18 DIAGNOSIS — R10.13 EPIGASTRIC PAIN: ICD-10-CM

## 2023-12-18 PROCEDURE — 99213 OFFICE O/P EST LOW 20 MIN: CPT

## 2023-12-18 RX ORDER — DOXYCYCLINE HYCLATE 100 MG/1
100 CAPSULE ORAL
Qty: 14 | Refills: 1 | Status: DISCONTINUED | COMMUNITY
Start: 2023-09-15 | End: 2023-12-18

## 2023-12-18 RX ORDER — FAMOTIDINE 40 MG/1
40 TABLET, FILM COATED ORAL
Qty: 30 | Refills: 1 | Status: DISCONTINUED | COMMUNITY
Start: 2023-08-21 | End: 2023-12-18

## 2023-12-18 RX ORDER — NORGESTIMATE AND ETHINYL ESTRADIOL 7DAYSX3 LO
0.18/0.215/0.25 KIT ORAL
Qty: 28 | Refills: 11 | Status: DISCONTINUED | COMMUNITY
Start: 2023-07-26 | End: 2023-12-18

## 2023-12-18 RX ORDER — CLOTRIMAZOLE AND BETAMETHASONE DIPROPIONATE 10; .5 MG/G; MG/G
1-0.05 CREAM TOPICAL TWICE DAILY
Qty: 1 | Refills: 1 | Status: DISCONTINUED | COMMUNITY
Start: 2022-02-02 | End: 2023-12-18

## 2023-12-18 NOTE — ASSESSMENT
[FreeTextEntry1] : 28-year-old female usual state of good health who had sudden onset of left upper quadrant discomfort and abdominal discomfort for which she sought care in the emergency room at [facility].  She has recently lost 12 lbs since 2/23 that she relates possibly the stress of school.  In the emergency room, her recent cbc, cmp all normal; .PElvic sonogram with left ovarian cyst, fluid noted in cul de sac. U/a normal. Seeing GYN at [facility] on [facility]. she states she has occasional chills.  There is no nausea or vomiting.  She may have had 1 episode of dark stool but was using Pepto-Bismol several days prior to that.  s/p left ovarian cystectomy. MRI abdomen was normal. Has cramping, increase gas, no diarrhea. States occasional chills. Feels changing from effexor capsules to tablets associated with pain. RTO 12/18/23- Gained james, seen with mother. Seeing nutritionist wednesday. On 37.5 mg effexor and mom queried whether she should be on higher dosage as previously on 300 mg. No longer under care of psychiatrist. Pt has no response to PPI or h2 blocker. States unable to socialize due to constant bloating. Had increase side effects with OCP since d/c. States abdominal pain worse with menses.  IMP: 1. IBS 2. Perimenses discomfort  PLAN: 1. trial of dicyclomine 2. Pt to discuss Low FODMAP diet with nutrition on wednesday 3. If no response to above, will order breath test for SIBO

## 2023-12-18 NOTE — HISTORY OF PRESENT ILLNESS
[de-identified] : 06/2023 normal except ovarian cyst [FreeTextEntry1] : 06/2023 normal [de-identified] : 06/2023 normal

## 2023-12-18 NOTE — REVIEW OF SYSTEMS
[Feeling Poorly] : feeling poorly [Abdominal Pain] : abdominal pain [Negative] : Heme/Lymph [Recent Weight Gain (___ Lbs)] : recent [unfilled] ~Ulb weight gain [Recent Weight Loss (___ Lbs)] : recent [unfilled] ~Ulb weight loss

## 2023-12-18 NOTE — PHYSICAL EXAM

## 2023-12-20 ENCOUNTER — APPOINTMENT (OUTPATIENT)
Dept: INTERNAL MEDICINE | Facility: CLINIC | Age: 28
End: 2023-12-20
Payer: MEDICAID

## 2023-12-20 VITALS
BODY MASS INDEX: 18.48 KG/M2 | HEART RATE: 81 BPM | SYSTOLIC BLOOD PRESSURE: 112 MMHG | HEIGHT: 64 IN | OXYGEN SATURATION: 100 % | TEMPERATURE: 97.6 F | WEIGHT: 108.25 LBS | DIASTOLIC BLOOD PRESSURE: 73 MMHG

## 2023-12-20 DIAGNOSIS — Z00.00 ENCOUNTER FOR GENERAL ADULT MEDICAL EXAMINATION W/OUT ABNORMAL FINDINGS: ICD-10-CM

## 2023-12-20 PROCEDURE — 99213 OFFICE O/P EST LOW 20 MIN: CPT

## 2023-12-20 RX ORDER — HYDROXYZINE HYDROCHLORIDE 10 MG/1
10 TABLET ORAL EVERY 8 HOURS
Qty: 30 | Refills: 0 | Status: DISCONTINUED | COMMUNITY
Start: 2022-12-29 | End: 2023-12-20

## 2023-12-20 RX ORDER — DIAZEPAM 100 %
POWDER (GRAM) MISCELLANEOUS
Qty: 30 | Refills: 0 | Status: DISCONTINUED | COMMUNITY
Start: 2022-06-09 | End: 2023-12-20

## 2023-12-20 NOTE — ASSESSMENT
[FreeTextEntry1] : -BP is stable. -Check A1c, Lipid panel and vitamin levels.  Differential included IBS, gastritis, functional GUT disorder, possible nerve entrapment ? -To continue GI recommendations. Discussed PPI if no improvement.

## 2023-12-20 NOTE — ADDENDUM
[FreeTextEntry1] : I, Wolfgang Land, acted as a scribe on behalf of Dr. Jose Daniel Ghosh MD, on 12/20/2023.   All medical entries made by the scribe were at my, Dr. Jose Daniel Ghosh MD, direction and personally dictated by me on 12/20/2023. I have reviewed the chart and agree that the record accurately reflects my personal performance of the history, physical exam, assessment and plan. I have also personally directed, reviewed, and agreed with the chart.

## 2023-12-20 NOTE — HISTORY OF PRESENT ILLNESS
[FreeTextEntry1] : Patient presents for follow-up.  [de-identified] : Patient reports she had emergency cystectomy procedure. GYN started OCPs however had stomach issues. Constant stomach issues started since August after the procedure. Described burning sensation before and after eating. Pt has lost a lot of weight secondary to stomach issues. Notes of soft bowel habits. Tried Prilosec, Pepcid with no alleviation. If having flare-up would get nocturnal awakenings. Followed with GI this week and was prescribed with Dicyclomine.  Currently taking efforxor 37.5 mg. Has not started on 75 mg. Increased anxiety with stomach issues.

## 2023-12-21 ENCOUNTER — TRANSCRIPTION ENCOUNTER (OUTPATIENT)
Age: 28
End: 2023-12-21

## 2023-12-23 ENCOUNTER — TRANSCRIPTION ENCOUNTER (OUTPATIENT)
Age: 28
End: 2023-12-23

## 2023-12-23 LAB
25(OH)D3 SERPL-MCNC: 33.2 NG/ML
ALBUMIN SERPL ELPH-MCNC: 5 G/DL
ALP BLD-CCNC: 66 U/L
ALT SERPL-CCNC: 20 U/L
ANION GAP SERPL CALC-SCNC: 10 MMOL/L
AST SERPL-CCNC: 20 U/L
BASOPHILS # BLD AUTO: 0.04 K/UL
BASOPHILS NFR BLD AUTO: 0.9 %
BILIRUB SERPL-MCNC: 0.4 MG/DL
BUN SERPL-MCNC: 14 MG/DL
CALCIUM SERPL-MCNC: 10.1 MG/DL
CHLORIDE SERPL-SCNC: 103 MMOL/L
CHOLEST SERPL-MCNC: 169 MG/DL
CO2 SERPL-SCNC: 27 MMOL/L
CREAT SERPL-MCNC: 0.84 MG/DL
EGFR: 98 ML/MIN/1.73M2
EOSINOPHIL # BLD AUTO: 0.1 K/UL
EOSINOPHIL NFR BLD AUTO: 2.1 %
ESTIMATED AVERAGE GLUCOSE: 97 MG/DL
GLUCOSE SERPL-MCNC: 70 MG/DL
HBA1C MFR BLD HPLC: 5 %
HCT VFR BLD CALC: 39.7 %
HDLC SERPL-MCNC: 86 MG/DL
HGB BLD-MCNC: 13 G/DL
IMM GRANULOCYTES NFR BLD AUTO: 0.2 %
LDLC SERPL CALC-MCNC: 74 MG/DL
LYMPHOCYTES # BLD AUTO: 1.63 K/UL
LYMPHOCYTES NFR BLD AUTO: 34.8 %
MAN DIFF?: NORMAL
MCHC RBC-ENTMCNC: 29.4 PG
MCHC RBC-ENTMCNC: 32.7 GM/DL
MCV RBC AUTO: 89.8 FL
MONOCYTES # BLD AUTO: 0.48 K/UL
MONOCYTES NFR BLD AUTO: 10.3 %
NEUTROPHILS # BLD AUTO: 2.42 K/UL
NEUTROPHILS NFR BLD AUTO: 51.7 %
NONHDLC SERPL-MCNC: 83 MG/DL
PLATELET # BLD AUTO: 281 K/UL
POTASSIUM SERPL-SCNC: 4.8 MMOL/L
PROT SERPL-MCNC: 8 G/DL
RBC # BLD: 4.42 M/UL
RBC # FLD: 12.6 %
SODIUM SERPL-SCNC: 140 MMOL/L
TRIGL SERPL-MCNC: 47 MG/DL
TSH SERPL-ACNC: 2.61 UIU/ML
VIT B12 SERPL-MCNC: 870 PG/ML
WBC # FLD AUTO: 4.68 K/UL

## 2023-12-24 ENCOUNTER — TRANSCRIPTION ENCOUNTER (OUTPATIENT)
Age: 28
End: 2023-12-24

## 2023-12-27 ENCOUNTER — TRANSCRIPTION ENCOUNTER (OUTPATIENT)
Age: 28
End: 2023-12-27

## 2023-12-27 RX ORDER — OMEPRAZOLE 40 MG/1
40 CAPSULE, DELAYED RELEASE ORAL
Qty: 90 | Refills: 3 | Status: ACTIVE | COMMUNITY
Start: 2023-12-27 | End: 1900-01-01

## 2023-12-29 ENCOUNTER — TRANSCRIPTION ENCOUNTER (OUTPATIENT)
Age: 28
End: 2023-12-29

## 2024-01-10 ENCOUNTER — APPOINTMENT (OUTPATIENT)
Dept: GASTROENTEROLOGY | Facility: CLINIC | Age: 29
End: 2024-01-10
Payer: MEDICAID

## 2024-01-10 VITALS
SYSTOLIC BLOOD PRESSURE: 104 MMHG | TEMPERATURE: 98 F | HEART RATE: 75 BPM | DIASTOLIC BLOOD PRESSURE: 70 MMHG | WEIGHT: 110 LBS | BODY MASS INDEX: 18.78 KG/M2 | OXYGEN SATURATION: 99 % | HEIGHT: 64 IN | RESPIRATION RATE: 14 BRPM

## 2024-01-10 DIAGNOSIS — R10.12 LEFT UPPER QUADRANT PAIN: ICD-10-CM

## 2024-01-10 DIAGNOSIS — R14.0 ABDOMINAL DISTENSION (GASEOUS): ICD-10-CM

## 2024-01-10 PROCEDURE — 99213 OFFICE O/P EST LOW 20 MIN: CPT

## 2024-01-10 RX ORDER — DICYCLOMINE HYDROCHLORIDE 10 MG/1
10 CAPSULE ORAL 3 TIMES DAILY
Qty: 90 | Refills: 0 | Status: DISCONTINUED | COMMUNITY
Start: 2023-12-18 | End: 2024-01-10

## 2024-01-10 NOTE — ASSESSMENT
[FreeTextEntry1] : 29-year-old female usual state of good health who had sudden onset of left upper quadrant discomfort and abdominal discomfort for which she sought care in the emergency room at [facility].  She has recently lost 12 lbs since 2/23 that she relates possibly the stress of school.  In the emergency room, her recent cbc, cmp all normal; .PElvic sonogram with left ovarian cyst, fluid noted in cul de sac. U/a normal. Seeing GYN at [facility] on [facility]. she states she has occasional chills.  There is no nausea or vomiting.  She may have had 1 episode of dark stool but was using Pepto-Bismol several days prior to that.  s/p left ovarian cystectomy. MRI abdomen was normal. Has cramping, increase gas, no diarrhea. States occasional chills. Feels changing from effexor capsules to tablets associated with pain. RTO 12/18/23- Gained james, seen with mother. Seeing nutritionist wednesday. On 37.5 mg effexor and mom queried whether she should be on higher dosage as previously on 300 mg. No longer under care of psychiatrist. Pt has no response to PPI or h2 blocker. States unable to socialize due to constant bloating. Had increase side effects with OCP since d/c. States abdominal pain worse with menses.  RTO 1/10/24- unable to tolerate bentyl due to HA. Resumed omeprazole and feeling a bit better, except has still some post prandial ruq and epigastric discomfort. Weight is stable.Finds lifting weights irritates epigastric pain as well.  IMP: 1. IBS 2. Perimenses discomfort 3. exclude sludge/stones  PLAN: 1.Maintain omeprazole 2. ultrasound abdomen to be followed by MRCP 3. If no response to above, will order breath test for SIBO

## 2024-01-10 NOTE — PHYSICAL EXAM

## 2024-01-10 NOTE — REVIEW OF SYSTEMS
[Feeling Poorly] : feeling poorly [Recent Weight Gain (___ Lbs)] : recent [unfilled] ~Ulb weight gain [Recent Weight Loss (___ Lbs)] : recent [unfilled] ~Ulb weight loss [Abdominal Pain] : abdominal pain [Negative] : Heme/Lymph

## 2024-01-10 NOTE — HISTORY OF PRESENT ILLNESS
[de-identified] : 06/2023 normal except ovarian cyst [FreeTextEntry1] : 06/2023 normal [de-identified] : 06/2023 normal

## 2024-01-30 ENCOUNTER — TRANSCRIPTION ENCOUNTER (OUTPATIENT)
Age: 29
End: 2024-01-30

## 2024-01-31 ENCOUNTER — TRANSCRIPTION ENCOUNTER (OUTPATIENT)
Age: 29
End: 2024-01-31

## 2024-01-31 DIAGNOSIS — N13.30 UNSPECIFIED HYDRONEPHROSIS: ICD-10-CM

## 2024-02-01 ENCOUNTER — TRANSCRIPTION ENCOUNTER (OUTPATIENT)
Age: 29
End: 2024-02-01

## 2024-02-13 ENCOUNTER — TRANSCRIPTION ENCOUNTER (OUTPATIENT)
Age: 29
End: 2024-02-13

## 2024-02-19 ENCOUNTER — TRANSCRIPTION ENCOUNTER (OUTPATIENT)
Age: 29
End: 2024-02-19

## 2024-03-13 ENCOUNTER — APPOINTMENT (OUTPATIENT)
Dept: GASTROENTEROLOGY | Facility: CLINIC | Age: 29
End: 2024-03-13

## 2024-04-10 ENCOUNTER — APPOINTMENT (OUTPATIENT)
Dept: UROLOGY | Facility: IMAGING CENTER | Age: 29
End: 2024-04-10

## 2024-04-25 ENCOUNTER — APPOINTMENT (OUTPATIENT)
Dept: INTERNAL MEDICINE | Facility: CLINIC | Age: 29
End: 2024-04-25

## 2024-06-10 NOTE — ASSESSMENT
[FreeTextEntry1] : Also restart Effexor 37.5 mg extended release, name of psychiatrist provided, if panic attacks or anxiety attacks hydroxyzine given.  Discussed potential side effects including increased somnolence. Yes

## 2024-07-05 ENCOUNTER — TRANSCRIPTION ENCOUNTER (OUTPATIENT)
Age: 29
End: 2024-07-05

## 2024-07-10 ENCOUNTER — APPOINTMENT (OUTPATIENT)
Dept: INTERNAL MEDICINE | Facility: CLINIC | Age: 29
End: 2024-07-10
Payer: COMMERCIAL

## 2024-07-10 DIAGNOSIS — Z79.899 OTHER LONG TERM (CURRENT) DRUG THERAPY: ICD-10-CM

## 2024-07-10 DIAGNOSIS — Z51.81 ENCOUNTER FOR THERAPEUTIC DRUG LVL MONITORING: ICD-10-CM

## 2024-07-10 PROCEDURE — 99214 OFFICE O/P EST MOD 30 MIN: CPT

## 2024-07-11 PROBLEM — Z79.899 MEDICATION MANAGEMENT: Status: ACTIVE | Noted: 2024-07-11

## 2024-07-11 PROBLEM — Z51.81 MEDICATION TITRATED: Status: ACTIVE | Noted: 2024-07-11

## 2024-07-20 ENCOUNTER — TRANSCRIPTION ENCOUNTER (OUTPATIENT)
Age: 29
End: 2024-07-20

## 2024-07-29 ENCOUNTER — TRANSCRIPTION ENCOUNTER (OUTPATIENT)
Age: 29
End: 2024-07-29

## 2024-09-03 ENCOUNTER — RX RENEWAL (OUTPATIENT)
Age: 29
End: 2024-09-03

## 2024-09-04 ENCOUNTER — APPOINTMENT (OUTPATIENT)
Dept: INTERNAL MEDICINE | Facility: CLINIC | Age: 29
End: 2024-09-04

## 2024-09-09 NOTE — HISTORY OF PRESENT ILLNESS
[de-identified] : Odette is a 30 y/o female here for a consultation visit, gallbladder.   US Abdomen on 1/24/24 - Gallbladder: Small, 3 mm polyp (Gallbladder polyps less than or equal to 6 mm are considered benign). No gallstone.   HIDA scan on - ___________________ CD ???

## 2024-09-13 ENCOUNTER — APPOINTMENT (OUTPATIENT)
Dept: SURGERY | Facility: CLINIC | Age: 29
End: 2024-09-13

## 2024-12-02 ENCOUNTER — TRANSCRIPTION ENCOUNTER (OUTPATIENT)
Age: 29
End: 2024-12-02

## 2024-12-24 ENCOUNTER — APPOINTMENT (OUTPATIENT)
Dept: INTERNAL MEDICINE | Facility: CLINIC | Age: 29
End: 2024-12-24
Payer: COMMERCIAL

## 2024-12-24 VITALS
HEIGHT: 64 IN | OXYGEN SATURATION: 98 % | HEART RATE: 74 BPM | BODY MASS INDEX: 19.29 KG/M2 | DIASTOLIC BLOOD PRESSURE: 70 MMHG | SYSTOLIC BLOOD PRESSURE: 111 MMHG | WEIGHT: 113 LBS | TEMPERATURE: 97.9 F

## 2024-12-24 DIAGNOSIS — R10.30 LOWER ABDOMINAL PAIN, UNSPECIFIED: ICD-10-CM

## 2024-12-24 DIAGNOSIS — M25.561 PAIN IN RIGHT KNEE: ICD-10-CM

## 2024-12-24 DIAGNOSIS — Z87.39 PERSONAL HISTORY OF OTHER DISEASES OF THE MUSCULOSKELETAL SYSTEM AND CONNECTIVE TISSUE: ICD-10-CM

## 2024-12-24 DIAGNOSIS — Z51.81 ENCOUNTER FOR THERAPEUTIC DRUG LVL MONITORING: ICD-10-CM

## 2024-12-24 DIAGNOSIS — M54.9 DORSALGIA, UNSPECIFIED: ICD-10-CM

## 2024-12-24 DIAGNOSIS — Z78.9 OTHER SPECIFIED HEALTH STATUS: ICD-10-CM

## 2024-12-24 DIAGNOSIS — K21.9 GASTRO-ESOPHAGEAL REFLUX DISEASE W/OUT ESOPHAGITIS: ICD-10-CM

## 2024-12-24 DIAGNOSIS — Z01.84 ENCOUNTER FOR ANTIBODY RESPONSE EXAMINATION: ICD-10-CM

## 2024-12-24 DIAGNOSIS — Z11.1 ENCOUNTER FOR SCREENING FOR RESPIRATORY TUBERCULOSIS: ICD-10-CM

## 2024-12-24 DIAGNOSIS — S73.192A OTHER SPRAIN OF LEFT HIP, INITIAL ENCOUNTER: ICD-10-CM

## 2024-12-24 DIAGNOSIS — Z87.19 PERSONAL HISTORY OF OTHER DISEASES OF THE DIGESTIVE SYSTEM: ICD-10-CM

## 2024-12-24 DIAGNOSIS — Z02.0 ENCOUNTER FOR EXAMINATION FOR ADMISSION TO EDUCATIONAL INSTITUTION: ICD-10-CM

## 2024-12-24 DIAGNOSIS — Z00.00 ENCOUNTER FOR GENERAL ADULT MEDICAL EXAMINATION W/OUT ABNORMAL FINDINGS: ICD-10-CM

## 2024-12-24 DIAGNOSIS — Z87.09 PERSONAL HISTORY OF OTHER DISEASES OF THE RESPIRATORY SYSTEM: ICD-10-CM

## 2024-12-24 DIAGNOSIS — Z87.898 PERSONAL HISTORY OF OTHER SPECIFIED CONDITIONS: ICD-10-CM

## 2024-12-24 DIAGNOSIS — M62.838 OTHER MUSCLE SPASM: ICD-10-CM

## 2024-12-24 DIAGNOSIS — M54.6 PAIN IN THORACIC SPINE: ICD-10-CM

## 2024-12-24 DIAGNOSIS — Z92.29 PERSONAL HISTORY OF OTHER DRUG THERAPY: ICD-10-CM

## 2024-12-24 DIAGNOSIS — M79.669 PAIN IN UNSPECIFIED LOWER LEG: ICD-10-CM

## 2024-12-24 DIAGNOSIS — J35.8 OTHER CHRONIC DISEASES OF TONSILS AND ADENOIDS: ICD-10-CM

## 2024-12-24 DIAGNOSIS — J03.90 ACUTE TONSILLITIS, UNSPECIFIED: ICD-10-CM

## 2024-12-24 DIAGNOSIS — Z88.9 ALLERGY STATUS TO UNSPECIFIED DRUGS, MEDICAMENTS AND BIOLOGICAL SUBSTANCES: ICD-10-CM

## 2024-12-24 DIAGNOSIS — R07.89 OTHER CHEST PAIN: ICD-10-CM

## 2024-12-24 DIAGNOSIS — R10.12 LEFT UPPER QUADRANT PAIN: ICD-10-CM
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
## 2024-12-24 DIAGNOSIS — M25.552 PAIN IN LEFT HIP: ICD-10-CM

## 2024-12-24 DIAGNOSIS — H57.11 OCULAR PAIN, RIGHT EYE: ICD-10-CM

## 2024-12-24 DIAGNOSIS — N83.00 "FOLLICULAR CYST OF OVARY, UNSPECIFIED SIDE": ICD-10-CM

## 2024-12-24 DIAGNOSIS — R14.0 ABDOMINAL DISTENSION (GASEOUS): ICD-10-CM

## 2024-12-24 DIAGNOSIS — M62.89 OTHER SPECIFIED DISORDERS OF MUSCLE: ICD-10-CM

## 2024-12-24 DIAGNOSIS — N13.30 UNSPECIFIED HYDRONEPHROSIS: ICD-10-CM
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
## 2024-12-24 DIAGNOSIS — Z23 ENCOUNTER FOR IMMUNIZATION: ICD-10-CM

## 2024-12-24 DIAGNOSIS — N94.2 VAGINISMUS: ICD-10-CM

## 2024-12-24 PROCEDURE — 99395 PREV VISIT EST AGE 18-39: CPT

## 2024-12-27 ENCOUNTER — TRANSCRIPTION ENCOUNTER (OUTPATIENT)
Age: 29
End: 2024-12-27

## 2024-12-27 DIAGNOSIS — E87.5 HYPERKALEMIA: ICD-10-CM

## 2024-12-27 LAB
25(OH)D3 SERPL-MCNC: 25.6 NG/ML
ALBUMIN SERPL ELPH-MCNC: 4.7 G/DL
ALP BLD-CCNC: 62 U/L
ALT SERPL-CCNC: 29 U/L
ANION GAP SERPL CALC-SCNC: 9 MMOL/L
APPEARANCE: CLEAR
AST SERPL-CCNC: 22 U/L
BASOPHILS # BLD AUTO: 0.04 K/UL
BASOPHILS NFR BLD AUTO: 1 %
BILIRUB SERPL-MCNC: 0.6 MG/DL
BILIRUBIN URINE: NEGATIVE
BLOOD URINE: NEGATIVE
BUN SERPL-MCNC: 11 MG/DL
CALCIUM SERPL-MCNC: 9.7 MG/DL
CHLORIDE SERPL-SCNC: 103 MMOL/L
CHOLEST SERPL-MCNC: 141 MG/DL
CO2 SERPL-SCNC: 24 MMOL/L
COLOR: YELLOW
CREAT SERPL-MCNC: 0.67 MG/DL
EGFR: 122 ML/MIN/1.73M2
EOSINOPHIL # BLD AUTO: 0.34 K/UL
EOSINOPHIL NFR BLD AUTO: 8.5 %
ESTIMATED AVERAGE GLUCOSE: 105 MG/DL
FERRITIN SERPL-MCNC: 19 NG/ML
GLUCOSE QUALITATIVE U: NEGATIVE MG/DL
GLUCOSE SERPL-MCNC: 85 MG/DL
HBA1C MFR BLD HPLC: 5.3 %
HCT VFR BLD CALC: 37.4 %
HDLC SERPL-MCNC: 80 MG/DL
HGB BLD-MCNC: 12.4 G/DL
IMM GRANULOCYTES NFR BLD AUTO: 0.3 %
KETONES URINE: NEGATIVE MG/DL
LDLC SERPL CALC-MCNC: 50 MG/DL
LEUKOCYTE ESTERASE URINE: NEGATIVE
LYMPHOCYTES # BLD AUTO: 1.58 K/UL
LYMPHOCYTES NFR BLD AUTO: 39.7 %
MAN DIFF?: NORMAL
MCHC RBC-ENTMCNC: 29.5 PG
MCHC RBC-ENTMCNC: 33.2 G/DL
MCV RBC AUTO: 88.8 FL
MONOCYTES # BLD AUTO: 0.32 K/UL
MONOCYTES NFR BLD AUTO: 8 %
NEUTROPHILS # BLD AUTO: 1.69 K/UL
NEUTROPHILS NFR BLD AUTO: 42.5 %
NITRITE URINE: NEGATIVE
NONHDLC SERPL-MCNC: 61 MG/DL
PH URINE: 7.5
PLATELET # BLD AUTO: 221 K/UL
POTASSIUM SERPL-SCNC: 5.6 MMOL/L
PROT SERPL-MCNC: 7.2 G/DL
PROTEIN URINE: NEGATIVE MG/DL
RBC # BLD: 4.21 M/UL
RBC # FLD: 13 %
SODIUM SERPL-SCNC: 137 MMOL/L
SPECIFIC GRAVITY URINE: 1.01
T4 FREE SERPL-MCNC: 1 NG/DL
TRIGL SERPL-MCNC: 50 MG/DL
TSH SERPL-ACNC: 2.62 UIU/ML
UROBILINOGEN URINE: 0.2 MG/DL
VIT B12 SERPL-MCNC: 496 PG/ML
WBC # FLD AUTO: 3.98 K/UL

## 2024-12-30 ENCOUNTER — TRANSCRIPTION ENCOUNTER (OUTPATIENT)
Age: 29
End: 2024-12-30

## 2025-01-14 NOTE — ASU PREOP CHECKLIST - HAND OFF
yes
Quality 130: Documentation Of Current Medications In The Medical Record: Current Medications Documented
Detail Level: Simple

## 2025-01-22 ENCOUNTER — APPOINTMENT (OUTPATIENT)
Dept: INTERNAL MEDICINE | Facility: CLINIC | Age: 30
End: 2025-01-22
Payer: COMMERCIAL

## 2025-01-22 PROCEDURE — 36415 COLL VENOUS BLD VENIPUNCTURE: CPT

## 2025-01-27 ENCOUNTER — TRANSCRIPTION ENCOUNTER (OUTPATIENT)
Age: 30
End: 2025-01-27

## 2025-02-20 ENCOUNTER — APPOINTMENT (OUTPATIENT)
Dept: ORTHOPEDIC SURGERY | Facility: CLINIC | Age: 30
End: 2025-02-20

## 2025-03-11 ENCOUNTER — APPOINTMENT (OUTPATIENT)
Dept: OBGYN | Facility: CLINIC | Age: 30
End: 2025-03-11

## 2025-03-13 ENCOUNTER — NON-APPOINTMENT (OUTPATIENT)
Age: 30
End: 2025-03-13

## 2025-04-03 ENCOUNTER — APPOINTMENT (OUTPATIENT)
Dept: ORTHOPEDIC SURGERY | Facility: CLINIC | Age: 30
End: 2025-04-03

## 2025-04-08 ENCOUNTER — APPOINTMENT (OUTPATIENT)
Dept: GASTROENTEROLOGY | Facility: CLINIC | Age: 30
End: 2025-04-08

## 2025-04-09 ENCOUNTER — APPOINTMENT (OUTPATIENT)
Dept: OBGYN | Facility: CLINIC | Age: 30
End: 2025-04-09
Payer: COMMERCIAL

## 2025-04-09 VITALS
DIASTOLIC BLOOD PRESSURE: 76 MMHG | BODY MASS INDEX: 19.29 KG/M2 | SYSTOLIC BLOOD PRESSURE: 122 MMHG | HEIGHT: 64 IN | WEIGHT: 113 LBS

## 2025-04-09 DIAGNOSIS — R10.2 PELVIC AND PERINEAL PAIN: ICD-10-CM

## 2025-04-09 PROCEDURE — 99213 OFFICE O/P EST LOW 20 MIN: CPT

## 2025-04-09 RX ORDER — NORETHINDRONE 0.35 MG/1
0.35 TABLET ORAL DAILY
Qty: 1 | Refills: 6 | Status: ACTIVE | COMMUNITY
Start: 2025-04-09 | End: 1900-01-01

## 2025-04-17 ENCOUNTER — APPOINTMENT (OUTPATIENT)
Dept: GASTROENTEROLOGY | Facility: CLINIC | Age: 30
End: 2025-04-17
Payer: COMMERCIAL

## 2025-04-17 VITALS
HEART RATE: 76 BPM | RESPIRATION RATE: 14 BRPM | BODY MASS INDEX: 18.44 KG/M2 | WEIGHT: 108 LBS | SYSTOLIC BLOOD PRESSURE: 110 MMHG | OXYGEN SATURATION: 98 % | DIASTOLIC BLOOD PRESSURE: 62 MMHG | HEIGHT: 64 IN

## 2025-04-17 DIAGNOSIS — K58.0 IRRITABLE BOWEL SYNDROME WITH DIARRHEA: ICD-10-CM

## 2025-04-17 DIAGNOSIS — R10.12 LEFT UPPER QUADRANT PAIN: ICD-10-CM

## 2025-04-17 PROCEDURE — 99213 OFFICE O/P EST LOW 20 MIN: CPT

## 2025-04-18 RX ORDER — HYOSCYAMINE SULFATE 0.12 MG/1
0.12 TABLET ORAL
Qty: 90 | Refills: 3 | Status: DISCONTINUED | COMMUNITY
Start: 2025-04-17 | End: 2025-04-18

## 2025-04-21 ENCOUNTER — TRANSCRIPTION ENCOUNTER (OUTPATIENT)
Age: 30
End: 2025-04-21

## 2025-04-21 ENCOUNTER — APPOINTMENT (OUTPATIENT)
Dept: GASTROENTEROLOGY | Facility: CLINIC | Age: 30
End: 2025-04-21

## 2025-04-21 ENCOUNTER — APPOINTMENT (OUTPATIENT)
Dept: MRI IMAGING | Facility: CLINIC | Age: 30
End: 2025-04-21

## 2025-04-22 ENCOUNTER — APPOINTMENT (OUTPATIENT)
Dept: GASTROENTEROLOGY | Facility: CLINIC | Age: 30
End: 2025-04-22

## 2025-05-14 RX ORDER — HYOSCYAMINE SULFATE 0.12 MG/1
0.12 TABLET ORAL
Qty: 270 | Refills: 3 | Status: ACTIVE | COMMUNITY
Start: 2025-05-14 | End: 1900-01-01

## 2025-05-28 ENCOUNTER — APPOINTMENT (OUTPATIENT)
Dept: GASTROENTEROLOGY | Facility: CLINIC | Age: 30
End: 2025-05-28

## 2025-06-11 ENCOUNTER — APPOINTMENT (OUTPATIENT)
Dept: OBGYN | Facility: CLINIC | Age: 30
End: 2025-06-11

## 2025-06-30 ENCOUNTER — APPOINTMENT (OUTPATIENT)
Dept: ORTHOPEDIC SURGERY | Facility: CLINIC | Age: 30
End: 2025-06-30

## 2025-07-01 ENCOUNTER — APPOINTMENT (OUTPATIENT)
Dept: INTERNAL MEDICINE | Facility: CLINIC | Age: 30
End: 2025-07-01

## 2025-07-23 ENCOUNTER — NON-APPOINTMENT (OUTPATIENT)
Age: 30
End: 2025-07-23

## 2025-07-24 ENCOUNTER — APPOINTMENT (OUTPATIENT)
Dept: ORTHOPEDIC SURGERY | Facility: CLINIC | Age: 30
End: 2025-07-24
Payer: COMMERCIAL

## 2025-07-24 ENCOUNTER — APPOINTMENT (OUTPATIENT)
Dept: ORTHOPEDIC SURGERY | Facility: CLINIC | Age: 30
End: 2025-07-24

## 2025-07-24 VITALS — BODY MASS INDEX: 18.78 KG/M2 | HEIGHT: 64 IN | WEIGHT: 110 LBS

## 2025-07-24 DIAGNOSIS — M54.12 RADICULOPATHY, CERVICAL REGION: ICD-10-CM

## 2025-07-24 PROCEDURE — 99203 OFFICE O/P NEW LOW 30 MIN: CPT

## 2025-07-24 PROCEDURE — 72040 X-RAY EXAM NECK SPINE 2-3 VW: CPT

## 2025-08-07 ENCOUNTER — APPOINTMENT (OUTPATIENT)
Dept: ORTHOPEDIC SURGERY | Facility: CLINIC | Age: 30
End: 2025-08-07

## 2025-08-11 ENCOUNTER — APPOINTMENT (OUTPATIENT)
Dept: INTERNAL MEDICINE | Facility: CLINIC | Age: 30
End: 2025-08-11

## 2025-08-12 ENCOUNTER — APPOINTMENT (OUTPATIENT)
Dept: ORTHOPEDIC SURGERY | Facility: CLINIC | Age: 30
End: 2025-08-12

## 2025-08-20 ENCOUNTER — APPOINTMENT (OUTPATIENT)
Dept: ENDOCRINOLOGY | Facility: CLINIC | Age: 30
End: 2025-08-20

## 2025-08-26 ENCOUNTER — TRANSCRIPTION ENCOUNTER (OUTPATIENT)
Age: 30
End: 2025-08-26

## 2025-09-10 ENCOUNTER — APPOINTMENT (OUTPATIENT)
Dept: ORTHOPEDIC SURGERY | Facility: CLINIC | Age: 30
End: 2025-09-10
Payer: COMMERCIAL

## 2025-09-10 DIAGNOSIS — M54.30 SCIATICA, UNSPECIFIED SIDE: ICD-10-CM

## 2025-09-10 DIAGNOSIS — M54.31 SCIATICA, RIGHT SIDE: ICD-10-CM

## 2025-09-10 PROCEDURE — 72110 X-RAY EXAM L-2 SPINE 4/>VWS: CPT

## 2025-09-10 PROCEDURE — 99204 OFFICE O/P NEW MOD 45 MIN: CPT

## 2025-09-10 RX ORDER — TIZANIDINE 2 MG/1
2 TABLET ORAL
Qty: 30 | Refills: 1 | Status: ACTIVE | COMMUNITY
Start: 2025-09-10 | End: 1900-01-01

## 2025-09-10 RX ORDER — DICLOFENAC SODIUM 75 MG/1
75 TABLET, DELAYED RELEASE ORAL
Qty: 40 | Refills: 0 | Status: ACTIVE | COMMUNITY
Start: 2025-09-10 | End: 1900-01-01

## 2025-09-15 ENCOUNTER — APPOINTMENT (OUTPATIENT)
Dept: MRI IMAGING | Facility: IMAGING CENTER | Age: 30
End: 2025-09-15

## 2025-09-16 ENCOUNTER — APPOINTMENT (OUTPATIENT)
Dept: MRI IMAGING | Facility: CLINIC | Age: 30
End: 2025-09-16
Payer: COMMERCIAL

## 2025-09-16 PROCEDURE — 72141 MRI NECK SPINE W/O DYE: CPT | Mod: 26

## (undated) DEVICE — SUT POLYSORB 0 30" GS-23

## (undated) DEVICE — DRSG TELFA 3 X 8

## (undated) DEVICE — MARKING PEN W RULER

## (undated) DEVICE — APPLICATOR SURGICEL LAP TROCAR POINT 2.5MM X 150MM

## (undated) DEVICE — LIGASURE BLUNT TIP 37CM

## (undated) DEVICE — TROCAR GELPOINT MINI ADVANCED

## (undated) DEVICE — PREP BETADINE KIT

## (undated) DEVICE — TROCAR COVIDIEN BLUNT TIP HASSAN 10MM

## (undated) DEVICE — UTERINE MANIPULATOR CLINICAL INNOVATIONS CLEARVIEW 7CM

## (undated) DEVICE — MEDICATION LABELS W MARKER

## (undated) DEVICE — LIGASURE ATLAS 10MM 37CM

## (undated) DEVICE — DRSG MASTISOL

## (undated) DEVICE — INSUFFLATION NDL COVIDIEN STEP 14G FOR STEP/VERSASTEP

## (undated) DEVICE — SOL IRR POUR NS 0.9% 500ML

## (undated) DEVICE — DRSG OPSITE 2.5 X 2"

## (undated) DEVICE — TUBING SUCTION 20FT

## (undated) DEVICE — UTERINE MANIPULATOR CONMED VCARE SM 32MM

## (undated) DEVICE — SHEARS HARMONIC 1100 5MM X 36CM CURVED TIP

## (undated) DEVICE — VISITEC 4X4

## (undated) DEVICE — SYR LUER LOK 10CC

## (undated) DEVICE — SPECIMEN CONTAINER 100ML

## (undated) DEVICE — ELCTR DISSECTOR ULTRASONIC CORDLESS CVD JAW 5MM-39CM

## (undated) DEVICE — POSITIONER FOAM EGG CRATE ULNAR 2PCS (PINK)

## (undated) DEVICE — GLV 6.5 PROTEXIS (WHITE)

## (undated) DEVICE — DRAPE 3/4 SHEET W REINFORCEMENT 56X77"

## (undated) DEVICE — DISSECTOR ENDO PEANUT 5MM

## (undated) DEVICE — GLV 7.5 PROTEXIS (WHITE)

## (undated) DEVICE — TROCAR COVIDIEN STEP 5MM SHORT 70MM

## (undated) DEVICE — WARMING BLANKET UPPER ADULT

## (undated) DEVICE — TUBING INSUFFLATION LAP FILTER 10FT

## (undated) DEVICE — UTERINE MANIPULATOR CONMED VCARE MED 34MM

## (undated) DEVICE — BLADE SCALPEL SAFETYLOCK #10

## (undated) DEVICE — SOL IRR POUR H2O 250ML

## (undated) DEVICE — DRAPE 1/2 SHEET 40X57"

## (undated) DEVICE — DISSECTOR COVIDIEN ROTICULATOR 5MM W MONOPOLAR CAUTERY

## (undated) DEVICE — ENDOCATCH 10MM SPECIMEN POUCH

## (undated) DEVICE — LIGASURE MARYLAND 37CM

## (undated) DEVICE — DRAPE TOWEL BLUE 17" X 24"

## (undated) DEVICE — ELCTR BOVIE PENCIL SMOKE EVACUATION

## (undated) DEVICE — GRASPER COVIDIEN ENDO GRASP ROTICULATOR 5MM W SPIN LOCK

## (undated) DEVICE — GOWN TRIMAX LG

## (undated) DEVICE — BLADE SCALPEL SAFETYLOCK #15

## (undated) DEVICE — TROCAR COVIDIEN VERSAONE BLADED FIXATION 11MM STANDARD

## (undated) DEVICE — PACK GYN LAPAROSCOPY

## (undated) DEVICE — ENDOCATCH II 15MM

## (undated) DEVICE — TROCAR COVIDIEN VERSASTEP SLEEVE

## (undated) DEVICE — VENODYNE/SCD SLEEVE CALF LARGE

## (undated) DEVICE — DRAPE INSTRUMENT POUCH 6.75" X 11"

## (undated) DEVICE — DRAPE MAYO STAND 30"

## (undated) DEVICE — SUT BIOSYN 4-0 18" P-12

## (undated) DEVICE — DRAPE LIGHT HANDLE COVER (BLUE)

## (undated) DEVICE — DRAPE GENERAL ENDOSCOPY

## (undated) DEVICE — TUBING STRYKEFLOW II SUCTION / IRRIGATOR

## (undated) DEVICE — NDL HYPO SAFE 22G X 1.5" (BLACK)

## (undated) DEVICE — NDL COUNTER FOAM AND MAGNET 40-70

## (undated) DEVICE — LAP PAD 18 X 18"

## (undated) DEVICE — TUBING TUR 2 PRONG

## (undated) DEVICE — FOLEY TRAY 16FR LF URINE METER SURESTEP

## (undated) DEVICE — UTERINE MANIPULATOR COOPER SURGICAL 5MM 33CM GREEN

## (undated) DEVICE — TROCAR APPLIED MEDICAL KII BALLOON BLUNT TIP 12MM X 100MM

## (undated) DEVICE — STAPLER SKIN VISI-STAT 35 WIDE

## (undated) DEVICE — APPLICATOR VISTASEAL LAP DUAL 45CM FLEX

## (undated) DEVICE — PREP CHLORAPREP HI-LITE ORANGE 26ML

## (undated) DEVICE — UTERINE MANIPULATOR CONMED VCARE LG 37MM

## (undated) DEVICE — NDL HYPO SAFE 18G X 1.5" (PINK)

## (undated) DEVICE — DRSG STERISTRIPS 0.5 X 4"

## (undated) DEVICE — Device

## (undated) DEVICE — GLV 7 PROTEXIS (WHITE)

## (undated) DEVICE — TROCAR COVIDIEN VERSASTEP PLUS 11MM STANDARD

## (undated) DEVICE — D HELP - CLEARVIEW CLEARIFY SYSTEM